# Patient Record
Sex: FEMALE | Race: WHITE | Employment: FULL TIME | URBAN - METROPOLITAN AREA
[De-identification: names, ages, dates, MRNs, and addresses within clinical notes are randomized per-mention and may not be internally consistent; named-entity substitution may affect disease eponyms.]

---

## 2022-09-19 ENCOUNTER — OFFICE VISIT (OUTPATIENT)
Dept: SURGERY | Age: 47
End: 2022-09-19
Payer: COMMERCIAL

## 2022-09-19 VITALS
SYSTOLIC BLOOD PRESSURE: 128 MMHG | BODY MASS INDEX: 45.93 KG/M2 | HEIGHT: 63 IN | OXYGEN SATURATION: 98 % | WEIGHT: 259.2 LBS | HEART RATE: 90 BPM | TEMPERATURE: 97.3 F | DIASTOLIC BLOOD PRESSURE: 79 MMHG

## 2022-09-19 DIAGNOSIS — G47.33 OBSTRUCTIVE SLEEP APNEA SYNDROME: ICD-10-CM

## 2022-09-19 DIAGNOSIS — K21.00 GASTROESOPHAGEAL REFLUX DISEASE WITH ESOPHAGITIS WITHOUT HEMORRHAGE: ICD-10-CM

## 2022-09-19 DIAGNOSIS — E78.00 HIGH CHOLESTEROL: ICD-10-CM

## 2022-09-19 DIAGNOSIS — I10 HYPERTENSION, UNSPECIFIED TYPE: ICD-10-CM

## 2022-09-19 DIAGNOSIS — M19.90 ARTHRITIS: ICD-10-CM

## 2022-09-19 DIAGNOSIS — E66.01 MORBID OBESITY WITH BMI OF 45.0-49.9, ADULT (HCC): ICD-10-CM

## 2022-09-19 DIAGNOSIS — E66.01 MORBID OBESITY (HCC): Primary | ICD-10-CM

## 2022-09-19 PROBLEM — F41.9 ANXIETY: Status: ACTIVE | Noted: 2022-09-19

## 2022-09-19 PROBLEM — K80.20 GALLSTONES: Status: ACTIVE | Noted: 2022-09-19

## 2022-09-19 PROBLEM — K21.9 GERD (GASTROESOPHAGEAL REFLUX DISEASE): Status: ACTIVE | Noted: 2022-09-19

## 2022-09-19 PROBLEM — N20.0 KIDNEY STONES: Status: ACTIVE | Noted: 2022-09-19

## 2022-09-19 PROBLEM — M47.812 DJD (DEGENERATIVE JOINT DISEASE), CERVICAL: Status: ACTIVE | Noted: 2022-09-19

## 2022-09-19 PROCEDURE — 99205 OFFICE O/P NEW HI 60 MIN: CPT | Performed by: NURSE PRACTITIONER

## 2022-09-19 RX ORDER — CONJUGATED ESTROGENS 0.62 MG/G
CREAM VAGINAL
COMMUNITY
Start: 2022-08-08 | End: 2023-04-05

## 2022-09-19 RX ORDER — PANTOPRAZOLE SODIUM 40 MG/1
1 TABLET, DELAYED RELEASE ORAL DAILY
COMMUNITY
Start: 2022-05-26

## 2022-09-19 RX ORDER — FLUTICASONE PROPIONATE 50 MCG
1 SPRAY, SUSPENSION (ML) NASAL DAILY
COMMUNITY

## 2022-09-19 RX ORDER — PROPRANOLOL HYDROCHLORIDE 10 MG/1
1 TABLET ORAL 2 TIMES DAILY
COMMUNITY
Start: 2021-12-13

## 2022-09-19 RX ORDER — CETIRIZINE HYDROCHLORIDE 5 MG/1
5 TABLET ORAL AS NEEDED
COMMUNITY

## 2022-09-19 RX ORDER — VENLAFAXINE HYDROCHLORIDE 150 MG/1
CAPSULE, EXTENDED RELEASE ORAL DAILY
COMMUNITY

## 2022-09-19 RX ORDER — CALCIUM CARBONATE 300MG(750)
1 TABLET,CHEWABLE ORAL DAILY
COMMUNITY

## 2022-09-19 RX ORDER — AMLODIPINE BESYLATE 5 MG/1
TABLET ORAL
COMMUNITY
Start: 2021-12-13

## 2022-09-19 RX ORDER — ROSUVASTATIN CALCIUM 10 MG/1
10 TABLET, COATED ORAL DAILY
COMMUNITY
Start: 2022-07-14 | End: 2023-07-14

## 2022-09-19 RX ORDER — ARIPIPRAZOLE 2 MG/1
TABLET ORAL
COMMUNITY
Start: 2022-02-24

## 2022-09-19 NOTE — PROGRESS NOTES
Bariatric Surgery Consultation    Subjective: The patient is a 56 y.o. obese female with a Body mass index is 45.92 kg/m². .  The patient is at her heaviest weight for the past several years. she has been overweight since age 26.   she has been considering surgery since past year. she desires surgery at this time because of multiple health concerns and their lifestyle issues which are hindered by their weight. she has been referred by her family physician Dr Arcadio Waller for evaluation and treatment of their obesity via surgical intervention. Blanche Lemons has tried multiple diets in her lifetime most recently tried physician supervised, behavior modification, unsupervised diets, Weight Watchers, Iris Cea, [place], and [employer]    Bariatric comorbidities present are   Patient Active Problem List   Diagnosis Code    Morbid obesity ([facility] [address] E66.01    Morbid obesity with BMI of 45.0-49.9, adult ([facility] [address] E66.01, Z68.42    High cholesterol E78.00    Arthritis M19.90    Gallstones K80.20    GERD (gastroesophageal reflux disease) K21.9    Anxiety F41.9    Hypertension I10    Kidney stones N20.0    Obstructive sleep apnea syndrome G47.33    DJD (degenerative joint disease), cervical M47.812       The patient is considering laparoscopic sleeve gastrectomy for surgical weight loss due to their ineffective progress with medical forms of weight loss and the urging of their physician who cares for their primary medical issues. The patient  now presents  for consideration for weight loss surgery understanding the benefits of this over a medical approach of weight loss as was discussed in our presentation on weight loss surgery. They have discussed their plans both with their family and primary care physician who is in support of their pursuit of such. The patient has not had health issues as of late and denies and gastrointestinal disturbances other than what is outlined below in their review of symptoms.  All of their prior evaluations available by both their PCP's and specialists physicians have been reviewed today either in the Care Everywhere portal or scanned under the media tab. I have spent a large portion of my initial consultation today reviewing the patients current dietary habits which have contributed to their health issues and obesity. I have suggested to them personally a dietary regimen that they can initiate now to help with their status as it pertains to their weight. They understand that the most important aspect of their journey through their weight loss endeavor will be their adherence to a new lifestyle of healthy eating behavior. They also understand that an adherence to an exercise program will not only help with weight loss but is ultimately important in weight maintenance. The patients goal weight is 158 lb. These goals are consistent with expected outcomes of their desired operation. her Medical goals are resolution of these health issues. Patient Active Problem List    Diagnosis Date Noted    Morbid obesity ([facility] [address] 09/19/2022    Morbid obesity with BMI of 45.0-49.9, adult ([facility] [address] 09/19/2022    High cholesterol 09/19/2022    Arthritis 09/19/2022    Gallstones 09/19/2022    GERD (gastroesophageal reflux disease) 09/19/2022    Anxiety 09/19/2022    Kidney stones 09/19/2022    DJD (degenerative joint disease), cervical 09/19/2022    Obstructive sleep apnea syndrome 2020    Hypertension 2017     Past Surgical History:   Procedure Laterality Date    HX ACL RECONSTRUCTION      HX HYSTERECTOMY        Social History     Tobacco Use    Smoking status: Never    Smokeless tobacco: Never   Substance Use Topics    Alcohol use: Yes      Family History   Problem Relation Age of Onset    Immune Disorder Mother     No Known Problems Father       Current Outpatient Medications   Medication Sig Dispense Refill    CHOLECALCIFEROL, VITAMIN D3, PO Take 5,000 Units by mouth daily.       magnesium oxide 400 mg magnesium tab Take 1 Tablet by mouth daily. amLODIPine (NORVASC) 5 mg tablet TAKE 1 TABLET DAILY (DOSE DECREASE)      ARIPiprazole (ABILIFY) 2 mg tablet       cetirizine (ZYRTEC) 5 mg tablet Take 5 mg by mouth as needed. conjugated estrogens (Premarin) 0.625 mg/gram vaginal cream Apply 1 gm intravaginally at bedtime three times a week for 8 weeks      fluticasone propionate (FLONASE) 50 mcg/actuation nasal spray 1 [place] by Nasal route daily. pantoprazole (PROTONIX) 40 mg tablet 1 Tablet daily. propranoloL (INDERAL) 10 mg tablet 1 Tablet two (2) times a day. rosuvastatin (CRESTOR) 10 mg tablet Take 10 mg by mouth daily. venlafaxine-SR (Effexor XR) 150 mg capsule Take  by mouth daily.        Allergies   Allergen Reactions    Cephalosporins Hives          Review of Systems:       General - No history or complaints of unexpected fever, chills, or weight loss  Head/Neck - No history or complaints of headache, diplopia, dysphagia, hearing loss  Cardiac - No history or complaints of chest pain, palpitations, murmur, or shortness of breath  Pulmonary - No history or complaints of shortness of breath, productive cough, hemoptysis  Gastrointestinal - has reflux controlled with PPI,no  abdominal pain, obstipation/constipation or blood per rectum  Genitourinary - No history or complaints of hematuria/dysuria, stress urinary incontinence symptoms, or renal lithiasis  Musculoskeletal - has joint pain in their knees,  no muscular weakness  Hematologic - No history or complaints of bleeding disorders,  No blood transfusions  Neurologic - No history or complaints of  migraine headaches, seizure activity, syncopal episodes, TIA or stroke  Integumentary - No history or complaints of rashes, abnormal nevi, skin cancer  Gynecological - No abnormal bleeding s/p hysterectomy               Objective:   Visit Vitals  /79   Pulse 90   Temp 97.3 °F (36.3 °C)   Ht 5' 3\" (1.6 m)   Wt 117.6 kg (259 lb 3.2 oz)   SpO2 98% BMI 45.92 kg/m²       Physical Examination: General appearance - alert, well appearing, and in no distress  Mental status - alert, oriented to person, place, and time  Eyes - pupils equal and reactive, extraocular eye movements intact  Neck - supple, no significant adenopathy  Lymphatics - no palpable lymphadenopathy, no hepatosplenomegaly  Chest - clear to auscultation, no wheezes, rales or rhonchi, symmetric air entry  Heart - normal rate, regular rhythm, normal S1, S2, no murmurs, rubs, clicks or gallops  Abdomen - soft, nontender, nondistended, no masses or organomegaly  Back exam - full range of motion, no tenderness, palpable spasm or pain on motion  Neurological - alert, oriented, normal speech, no focal findings or movement disorder noted  Musculoskeletal - no joint tenderness, deformity or swelling  Extremities - peripheral pulses normal, no pedal edema, no clubbing or cyanosis  Skin - normal coloration and turgor, no rashes, no suspicious skin lesions noted    Labs:       No results found for this or any previous visit (from the past 1440 hour(s)). [facility]:  TSH 2.921  Free T4 0.68    7/7/22:  Hgb 12.7  Hct 39.8  Crt 0.55  LFTs WNL    Assessment:     Morbid obesity with comorbidity    Plan:     laparoscopic sleeve gastrectomy    This is a 56 y.o. female with a BMI of Body mass index is 45.92 kg/m². and the weight-related co-morbidties as noted below. Edmar Lau meets the NIH criteria for bariatric surgery based upon the BMI of Body mass index is 45.92 kg/m². and multiple weight-related co-morbidties. Edmar Lau has elected laparoscopic sleeve gastrectomy as her intervention of choice for treatment of morbid obestiy through surgical means secondary to its safety profile, rapid return to work  and decreases in operative risks over gastric bypass.     In the office today, following Melissa's history and physical examination, a 30 minute discussion regarding the anatomic alterations for the laparoscopic sleeve gastrectomy was undertaken. The dietary expectations and the patient and physician dependent factors for success were thoroughly discussed, to include the need for interval follow-up and long-term dietary changes associated with success. The possible complications of the sleeve gastrectomy  were also discussed, to include;death, DVT/PE, staple line leak, bleeding, stricture formation, infection, nutritional deficiencies and sleeve dilation. Specific weight related outcomes for success were also discussed with an emphasis on careful and close follow-up with the first year and eating behavior modification as the baseline and cyclical hunger return. The patient expressed an understanding of the above factors, and her questions were answered in their entirety. In addition, the patient attended a 1.5 hour power point seminar regarding obesity, surgical weight loss including, adjustable gastric band, gastric bypass, and sleeve gastrectomy. This discussion contrasted the different surgical techniques, mechanisms of actions and expected outcomes, and surgical and medical risks associated with each procedure. Today, the patient had all of her questions answered and desires to proceed with  bariatric surgery initially choosing sleeve gastrectomy as her surgical option. Long discussion had that due to her reflux requiring PPI and documented erosive gastritis on EGD from March 2021 that gastric bypass maybe better surgical option for her and she is agreeable to that is necessary. Will proceed with getting upper GI imaging to evaluate anatomy and degree of reflux. Secondary Diagnoses:     Dietary Intervention  - The patient is currently scheduled to see or has been followed by a bariatric nutritionist for an attempt at preoperative weight loss as has been dictated by their insurance carrier.   They will be assessed at various times during their follow up to evaluate their progress depending on the length of time that is required once again by their carrier. I have explained the importance of preoperative weight loss and the benefits regarding lower surgical risk and also assisting the patient in reaching their weight loss goal.  Finally they understand there is a physiologic benefit from the standpoint of hepatic volume reduction and reduction of central visceral adiposity preoperatively. I have reiterated the importance of a low carbohydrate and high protein regimen to achieve their stated goal. I have reviewed their current eating behavior prior to this encounter and explained to them in an exhaustive fashion the appropriate diet that they should adhere to. They have been encouraged to loose weight pre operatively and understand it is our prerogative to cancel surgery or postpone their procedure in the event of significant weight gain. The patients weight loss goal pre operatively is 10-15 pounds. GERD -The patient understands that weight loss surgery is not a guaranteed cure for reflux disease but does understand the benefits that weight loss can have on reflux disease. They also understand that at the time of surgery the gastroesophageal junction will be evaluated for the presence of a diaphragmatic hernia. Hernias will be corrected always with the gastric band and sleeve gastrectomy procedures, but only on a case by case basis with the gastric bypass. The patient also understands that neither weight loss surgery nor repair of a diaphragmatic hernia repair guarantees the complete cessation of the disease. Hypertension - the patient understands, and we have discussed in detail, that this is an active acute health problem that has a multifactorial effect on their body from the standpoint of healing. They understand that uncontrolled hypertension affects other organ processes and this leads to risk associated with surgical procedures.   They understand that in addition to hypertension, once they develop other health issues, their risk is exponentially worse. Currently they understand that this likely one of the single most important items that they need to control in the perioperative process. They understand that the hospitals protocol is that patients entering the operative suite should have a blood pressure reading less than 140/90 prior to surgery. Elevated readings today in office above 160/90 are recommended emergent PCP follow-up or if symptomatic to proceed to the ED. The patient has a clear understanding of how weight loss improves hypertension as a whole, but also they understand that there is a significant genetic component to this disease process. We will monitor the patients blood pressure while in the hospital and the plan would be to continue those medications postoperatively. If a diuretic is being used we will stop them on discharge to prevent dehydration particularly with the sleeve gastrectomy and the gastric bypass procedures. They will be instructed to monitor their blood pressure postoperatively while at home and notify their primary care physician in the event of any significantly high or uncharacteristic readings. Obstructive Sleep Apnea -The patient understands the association of sleep apnea and obesity and the additional risk that it caries related to post surgical complications. If they have not been tested for sleep apnea and I feel they are at increased risk for this diagnosis, then they will be scheduled for a consultation with a Pulmonologist for such. In the event that they maris this diagnosis we will have the patient bring their CPAP machine to the hospital for use both postoperatively in the PACU and on the floor at its appropriate setting. We will have them continue using it while at home after surgery and follow up with their pulmonologist 6 months after to be retested to see if it can be discontinued at that time period. Hyperlipidemia - The patient understands that studies show that almost all patient will realize an improvement in their lipid profile with weight loss that occurs with these procedures. They however also understand that hyperlipidemia is a multifactorial disease particularly as it pertains to their genetic background and that there is no guarantee toward cure  of this issue. We will resume their medications both pre operatively and immediately postoperatively as this tends to decrease any post operative cardiac events. The patient will follow up with their family physician in the postoperative period with plans to repeat their lipid panel 2-3 month postoperative for potential adjustment or removal of these medications. Weight Related Arthritis -The patient understands the benefits that weight loss surgery can have on their arthritis but also understands that weight loss is not a guaranteed cure and relief of symptoms is often dependent on the severity of the underlying disease. The patient also understands that traditional pharmaceutical treatments for this diagnosis are usually unavailable to post-operative weight loss patients due to the effects on the gastrointestinal tract. Any changes to the patients medication treatment will ultimately be made the patients PCP with input by our office. I spent a total of 60 minutes providing this service to the patient today to include discussing their surgical choice, reviewing their work-up to date, and performing a full history and physical examination.   Signed By: Tabitha Pelayo NP     September 19, 2022

## 2022-10-05 ENCOUNTER — APPOINTMENT (OUTPATIENT)
Dept: SURGERY | Age: 47
End: 2022-10-05

## 2022-10-05 ENCOUNTER — HOSPITAL ENCOUNTER (OUTPATIENT)
Age: 47
Setting detail: OUTPATIENT SURGERY
Discharge: HOME | End: 2022-10-05
Attending: SPECIALIST | Admitting: SPECIALIST
Payer: COMMERCIAL

## 2022-10-05 ENCOUNTER — APPOINTMENT (OUTPATIENT)
Dept: GENERAL RADIOLOGY | Age: 47
End: 2022-10-05
Attending: SPECIALIST
Payer: COMMERCIAL

## 2022-10-05 ENCOUNTER — HOSPITAL ENCOUNTER (OUTPATIENT)
Dept: PREADMISSION TESTING | Age: 47
Discharge: HOME | End: 2022-10-05
Payer: COMMERCIAL

## 2022-10-05 ENCOUNTER — HOSPITAL ENCOUNTER (OUTPATIENT)
Dept: BARIATRICS/WEIGHT MGMT | Age: 47
Discharge: HOME | End: 2022-10-05

## 2022-10-05 VITALS
HEART RATE: 87 BPM | BODY MASS INDEX: 45.68 KG/M2 | WEIGHT: 257.8 LBS | OXYGEN SATURATION: 98 % | DIASTOLIC BLOOD PRESSURE: 86 MMHG | RESPIRATION RATE: 18 BRPM | HEIGHT: 63 IN | SYSTOLIC BLOOD PRESSURE: 140 MMHG

## 2022-10-05 VITALS — BODY MASS INDEX: 46 KG/M2 | WEIGHT: 259.6 LBS | HEIGHT: 63 IN

## 2022-10-05 DIAGNOSIS — E66.01 MORBID OBESITY WITH BMI OF 45.0-49.9, ADULT (HCC): ICD-10-CM

## 2022-10-05 DIAGNOSIS — I10 HYPERTENSION, UNSPECIFIED TYPE: ICD-10-CM

## 2022-10-05 DIAGNOSIS — K21.9 GASTROESOPHAGEAL REFLUX DISEASE, UNSPECIFIED WHETHER ESOPHAGITIS PRESENT: Primary | ICD-10-CM

## 2022-10-05 DIAGNOSIS — G47.33 OBSTRUCTIVE SLEEP APNEA SYNDROME: ICD-10-CM

## 2022-10-05 DIAGNOSIS — E66.01 MORBID OBESITY (HCC): ICD-10-CM

## 2022-10-05 DIAGNOSIS — K21.00 GASTROESOPHAGEAL REFLUX DISEASE WITH ESOPHAGITIS WITHOUT HEMORRHAGE: ICD-10-CM

## 2022-10-05 DIAGNOSIS — E78.00 HIGH CHOLESTEROL: ICD-10-CM

## 2022-10-05 DIAGNOSIS — M19.90 ARTHRITIS: ICD-10-CM

## 2022-10-05 LAB
ATRIAL RATE: 97 BPM
CALCULATED P AXIS, ECG09: 53 DEGREES
CALCULATED R AXIS, ECG10: 83 DEGREES
CALCULATED T AXIS, ECG11: 25 DEGREES
DIAGNOSIS, 93000: NORMAL
P-R INTERVAL, ECG05: 150 MS
Q-T INTERVAL, ECG07: 364 MS
QRS DURATION, ECG06: 94 MS
QTC CALCULATION (BEZET), ECG08: 462 MS
VENTRICULAR RATE, ECG03: 97 BPM

## 2022-10-05 PROCEDURE — 74240 X-RAY XM UPR GI TRC 1CNTRST: CPT | Performed by: SPECIALIST

## 2022-10-05 PROCEDURE — 74011000250 HC RX REV CODE- 250: Performed by: SPECIALIST

## 2022-10-05 PROCEDURE — 93005 ELECTROCARDIOGRAM TRACING: CPT

## 2022-10-05 PROCEDURE — 76040000019: Performed by: SPECIALIST

## 2022-10-05 PROCEDURE — 74240 X-RAY XM UPR GI TRC 1CNTRST: CPT

## 2022-10-05 NOTE — PROCEDURES
Patient:Melissa Lewis   : 1975  Medical Record [id number]        PREPROCEDURE DIAGNOSIS: This patient is preoperative for laparoscopic gastric bypass surgery procedure with a history of  reflux disease. POSTPROCEDURE DIAGNOSIS: This patient is preoperative for laparoscopic gastric bypass surgery procedure with a history of  reflux disease. PROCEDURES PERFORMED: Upper GI study with barium. ESTIMATED BLOOD LOSS: None. SPECIMENS: None. STATEMENT OF MEDICAL NECESSITY: The patient is a patient with a  longstanding history of obesity. They are now considering the laparoscopic gastric bypass surgery procedure as a means of surgical weight control and due to their history of reflux disease and are being assessed preoperatively for such. DESCRIPTION OF PROCEDURE: The patient was brought to the fluoroscopy unit and  was given thin barium. On swallowing of barium, they were noted to have  normal peristalsis of their esophagus. They had prompt filling of distal  esophagus with tapering into the gastroesophageal junction. There was no evidence of a hiatal hernia present. Contrast then filled the gastric cardia, fundus,body and pre pyloric region with no abnormalities noted. Contrast then exited the pylorus in normal fashion. No obstruction was noted. There was no evidence of reflux noted.     (normal anatomy - she was consulted for a sleeve last month but has now decided on the bypass)    Peggy Irizarry MD

## 2022-10-05 NOTE — PROGRESS NOTES
Medical Weight Loss Multi-Disciplinary Program    Patient's Name: Nando Edge Age: 56 y.o. YOB: 1975 Sex: female     Session #1. Pt attended in-person class. Weight obtained in office. Date: 10/5/2022    Visit Vitals  Ht 5' 3\" (1.6 m)   Wt 117.8 kg (259 lb 9.6 oz)   BMI 45.99 kg/m²       Pounds Lost since last month: N/A Pounds Gained since last month: N/A       Starting Weight (Initial consult, 9/19/22): 259.2 lbs Previous Months Weight: N/A   Overall Pounds Lost: 0 lbs  Overall Pounds Gained: 0.4 lbs     Note that pt has a pre-operative weight loss goal of 10-15 lbs. Pt has not met this goal.       Do you smoke? No    Alcohol intake:  Number of drinks per week: <1    Class Guidelines    Guidelines are reviewed with patient at the start of every class. 1. Patient understands that weight loss trial classes must be consecutive. Patient understands if they miss a class, it is their responsibility to contact me to reschedule class. I will reach out to patient after their first no show. 2.  Patient understands the expectations that weight maintenance/weight loss is expected during the classes. Failure to demonstrate changes may result in extension of weight loss trial, followed by returning to see the surgeon. Patient understands that they CANNOT gain any weight during the weight loss trial.  Gaining weight will result in extension of weight loss trial.  3. Patient is also instructed to complete their labwork, psychological evaluation visit, and any other tests that the surgeon has ordered while they are working on their weight loss trial.  4.  Patient was instructed to bring their packet of nutrition education materials to every class and appointment.         Eating Habits and Behaviors    Reviewed intake  Understanding low-carbohydrate/low-sugar/low-fat, higher protein meals  Instruction given for personal dietary changes  Discussed perceived compliance    Comments: RD Reviewed Diet History and Physical Activity/Exercise habits. Recommended dietary changes discussed for both before and after surgery. Today in class we reviewed the Key Diet Principles. Patient was encouraged to consume 3 meals each day, and meal timing was reviewed. Meal time behaviors that will help pt to be successful with their weight loss efforts were also discussed. We discussed the importance of drinking adequate amounts of fluids, recommending that patient consume a minimum of 64 oz of sugar-free, caffeine-free and carbonation-free fluids each day. Patient was encouraged to eliminate sugar-sweetened beverages such as sweet tea, fruit juice, fruit smoothies, flavored coffee drinks and regular sodas. During the weight loss trial, patient is encouraged to focus on eating protein-forward meals, with a daily goal of  grams protein. Patient was also advised to decrease carbohydrate intake to <100 g/d, choosing complex vs. simple carbohydrates in limited amounts. We also discussed limiting fat intake. Encouraged patient to follow the \"3-gram rule\" when choosing foods by looking for items containing <3 g of fat and sugar per serving. We reviewed meal planning guidelines and discussed appropriate meal and snack options. We also talked about appropriate protein drinks and patient was encouraged to start trying these, using them either for a meal replacement or a protein-rich snack pre-operatively. We reviewed the nutritional guidelines for selecting protein shakes. Pre-operative vitamin and mineral supplementation was reviewed. Patient was instructed to choose a chewable complete multivitamin with iron in NON-gummy form. Selection of probiotic was also reviewed. We also talked about dining out after bariatric surgery. Patient was instructed on:   Following the Key Diet Principles to stay within the bariatric dietary guidelines  Key words to look for in menu item descriptions in order to make healthier choices  Requesting specific substitutions to allow meals to fit in with bariatric dietary guidelines  Using the restaurant card to request smaller portions of menu items or ordering from children's/senior's menu    Patient's current diet habits include:  Patient is eating 3 meals and 1 snacks per day. Per dietary recall, pts diet has the following concerns:  Snacks are high carbohydrate/low protein (I.e. cereal w/ milk)  Pt is consuming sugar sweetened foods 3 d/wk. Pt is consuming 4-6 oz sugar-sweetened beverages/day. Pt has found 1 protein supplement shakes for use post-op in meeting their protein needs. Patient's plan for dietary and/or behavior changes in the upcoming month: none noted    Physical Activity/Exercise    Comments: We talked about exercise. Patient was given reasons of why exercise is so important and how that can help with their long-term success. I have encouraged patient to get a support system to help with the activity. We talked about recommended types of physical activity, including walking, swimming, cycling, or chair exercises. I also talked with patient about doing some strength training, which helps the metabolism, as well as strengthen muscles and bones. Patient's plan to incorporate more activity includes: \"start walking the dog daily at night, weekly hike at a local park for 60-90 min at a minimum. \"      Behavior Modification     Comments:  During today's class, we discussed important behavior changes relating to dining away from home that will help the patient be successful in meeting their weight loss goals and improving their health including:   The need to limit the frequency of dining away from home, especially fast food and convenience food options, in order to limit intake of calories, fats, and carbohydrates  The importance of keeping a positive mindset by focusing on the occasion and enjoying the company rather than the foods they can't have and the need for reduced portion sizes after surgery  Avoiding starters such as appetizers, breads, bar snacks, soups, and salads  Researching to find restaurants that offer healthier food options and reviewing menus/nutrition facts online to have a plan of what to order before dining out    Goals: Work to increase to 3-4 small meals per day, with 1-2 planned snacks as needed. Recommend following the plate method for meal planning - focusing on lean protein, non-starchy vegetables, and measured amounts of complex carbohydrates. - Goal of  g protein and <100 g carbohydrates per day. - Select at least 2 DIFFERENT protein supplements that can be used for protein supplementation to meet goals pre- and post-operatively. - Practice Carbohydrate Counting and label reading.   - Follow 3 g rule for fat and sugar. 2. Slow down meals  - Chew each bite 25-35 times. - Aim for 20-30 min/meal.  3. Increase non-caloric fluids to 64 oz per day. Eliminate caffeine, added sugar, carbonation, and straws.               - Continue to work to decrease sugar sweetened beverages - goal of calorie-free beverages only. - Must eliminate caffeine prior to surgery and avoid for ~6-8 weeks. - Practice 30:30 rule,  food and fluid intake. 4. Start activity regimen, work to increase ADLs. 5. Start Complete MVI and probiotic at least 30 days pre-op. Candidate for surgery (per RD): PENDING, Pt scheduled for nutrition education on 11/1/22.

## 2022-11-01 ENCOUNTER — HOSPITAL ENCOUNTER (OUTPATIENT)
Dept: BARIATRICS/WEIGHT MGMT | Age: 47
Discharge: HOME | End: 2022-11-01

## 2022-11-01 VITALS — BODY MASS INDEX: 43.71 KG/M2 | HEIGHT: 63 IN | WEIGHT: 246.7 LBS

## 2022-11-01 NOTE — PROGRESS NOTES
Medical Weight Loss Multi-Disciplinary Program    Patient's Name: Valentino Simon Age: 56 y.o. YOB: 1975 Sex: female     Session #2. Pt attended in-person class. Weight obtained in office. Date: 11/1/2022    Visit Vitals  Ht 5' 3\" (1.6 m)   Wt 111.9 kg (246 lb 11.2 oz)   BMI 43.70 kg/m²       Pounds Lost since last month: 12.9 lbs Pounds Gained since last month: 0 lbs       Starting Weight: 259.2 lbs Previous Months Weight: 259.6 lbs   Overall Pounds Lost: 12.5 lbs  Overall Pounds Gained: 0 lbs     Note that pt has a pre-operative weight loss goal of 10-15 lbs. Pt has met this goal.       Do you smoke? No    Alcohol intake:  Number of drinks per week:  <2 per month    Class Guidelines    Guidelines are reviewed with patient at the start of every class. 1. Patient understands that weight loss trial classes must be consecutive. Patient understands if they miss a class, it is their responsibility to contact me to reschedule class. I will reach out to patient after their first no show. 2.  Patient understands the expectations that weight maintenance/weight loss is expected during the classes. Failure to demonstrate changes may result in extension of weight loss trial, followed by returning to see the surgeon. Patient understands that they CANNOT gain any weight during the weight loss trial.  Gaining weight will result in extension of weight loss trial.  3. Patient is also instructed to complete their labwork, psychological evaluation visit, and any other tests that the surgeon has ordered while they are working on their weight loss trial.  4.  Patient was instructed to bring their packet of nutrition education materials to every class and appointment.         Eating Habits and Behaviors    Reviewed intake  Understanding low carbohydrates, low sugar, higher protein meals  Instruction given for personal dietary changes  Discussed perceived compliance    Comments: RD Reviewed Diet History and Physical Activity/Exercise habits. Recommended dietary changes discussed for both before and after surgery. Today in class we reviewed the Key Diet Principles. Patient was encouraged to consume 3 meals each day, and meal timing was reviewed. Meal time behaviors that will help pt to be successful with their weight loss efforts were also discussed. We discussed the importance of drinking adequate amounts of fluids, recommending that patient consume a minimum of 64 oz of sugar-free, caffeine-free and carbonation-free fluids each day. Patient was encouraged to eliminate sugar-sweetened beverages such as sweet tea, fruit juice, fruit smoothies, flavored coffee drinks and regular sodas. During the weight loss trial, patient is encouraged to focus on eating protein-forward meals, with a daily goal of  grams protein. Patient was also advised to decrease carbohydrate intake to <100 g/d, choosing complex vs. simple carbohydrates in limited amounts. We also discussed limiting fat intake. Encouraged patient to follow the \"3-gram rule\" when choosing foods by looking for items containing <3 g of fat and sugar per serving. We reviewed meal planning guidelines and discussed appropriate meal and snack options. We also talked about appropriate protein drinks and patient was encouraged to start trying these, using them either for a meal replacement or a protein-rich snack pre-operatively. We reviewed the nutritional guidelines for selecting protein shakes. Pre-operative vitamin and mineral supplementation was reviewed. Patient was instructed to choose a chewable complete multivitamin with iron in NON-gummy form. Selection of probiotic was also reviewed. Comments: During today's class we talked about the role of carbohydrates. Patient was instructed on: The function of carbohydrates. Foods that are carbohydrate-heavy.   Patient was given the guidelines to keep their carbohydrates less than 50-75 grams per day in the pre-op phase. Patient was also given ideas of low carb swaps, which include zucchini noodles, spaghetti squash, or cauliflower rice. Discussed lower carb swaps to use instead of potato chips. Patient's current diet habits include:  Patient is eating 3 meals and 1-2 snacks per day. Per dietary recall, pts diet has the following concerns:  None noted   Pt has found 4 protein supplement shakes for use post-op in meeting their protein needs. Patient's plan for dietary and/or behavior changes in the upcoming month: none noted    Physical Activity/Exercise    Comments: We talked about exercise. Patient was given reasons of why exercise is so important and how that can help with their long-term success. I have encouraged patient to get a support system to help with the activity. We talked about recommended types of physical activity, including walking, swimming, cycling, or chair exercises. I also talked with patient about doing some strength training, which helps the metabolism, as well as strengthen muscles and bones. Patient's plan to incorporate more activity includes: \"Started to use ergometer for increased heart rate 3x/wk vs. fast walking. I take a walk on the days I don't use the ergometer. \"      Behavior Modification     Comments:  During today's class, I discussed vitamin and mineral supplementation essential for health and prevention of malnutrition in depth. Patient was directed to the handouts on vitamins and minerals found on pages 27-33 that were provided in class handouts packet as well as a handout titled, \"Nutrient Deficiency and it's symptoms\". I reviewed the vitamins and minerals that need to be supplemented, dosage recommendations, functions of supplements and signs of deficiencies, as well as examples of specific supplements.   Reviewed briefly the requirement  differences between laparoscopic gastric bypass, laparoscopic sleeve gastrectomy, and lap-band procedures, while encouraging all patients to continue supplements for life no matter what bariatric surgery they are preparing for. Goals: Work to increase to 3-4 small meals per day, with 1-2 planned snacks as needed. Recommend following the plate method for meal planning - focusing on lean protein, non-starchy vegetables, and measured amounts of complex carbohydrates. - Goal of  g protein and <100 g carbohydrates per day. - Select at least 2 DIFFERENT protein supplements that can be used for protein supplementation to meet goals pre- and post-operatively. - Practice Carbohydrate Counting and label reading.   - Follow 3 g rule for fat and sugar. 2. Slow down meals  - Chew each bite 25-35 times. - Aim for 20-30 min/meal.  3. Increase non-caloric fluids to 64 oz per day. Eliminate caffeine, added sugar, carbonation, and straws.               - Continue to work to decrease sugar sweetened beverages - goal of calorie-free beverages only. - Must eliminate caffeine prior to surgery and avoid for ~6-8 weeks. - Practice 30:30 rule,  food and fluid intake. 4. Start activity regimen, work to increase ADLs. 5. Start Complete MVI and probiotic at least 30 days pre-op. Candidate for surgery (per RD): PENDING, Pt scheduled for nutrition education on 12/1/22.

## 2022-12-01 ENCOUNTER — HOSPITAL ENCOUNTER (OUTPATIENT)
Dept: BARIATRICS/WEIGHT MGMT | Age: 47
Discharge: HOME | End: 2022-12-01

## 2022-12-15 NOTE — PROGRESS NOTES
Medical Weight Loss Multi-Disciplinary Program    Patient's Name: Anirudh Rivera Age: 56 y.o. YOB: 1975 Sex: female     Session #3. Pt attended in-person class. Weight obtained in office. Date: 12/1/2022    Visit Vitals  Ht 5' 3\" (1.6 m)   Wt 108 kg (238 lb)   BMI 42.16 kg/m²       Pounds Lost since last month: 8.7 lbs Pounds Gained since last month: 0 lbs       Starting Weight: 259.2 lbs Previous Months Weight: 246.7 lbs   Overall Pounds Lost: 21.2 lbs  Overall Pounds Gained: 0 lbs     Note that pt has a pre-operative weight loss goal of 10-15 lbs. Pt has met this goal.       Do you smoke? No    Alcohol intake:  Number of drinks per week: 0    Class Guidelines    Guidelines are reviewed with patient at the start of every class. 1. Patient understands that weight loss trial classes must be consecutive. Patient understands if they miss a class, it is their responsibility to contact me to reschedule class. I will reach out to patient after their first no show. 2.  Patient understands the expectations that weight maintenance/weight loss is expected during the classes. Failure to demonstrate changes may result in extension of weight loss trial, followed by returning to see the surgeon. Patient understands that they CANNOT gain any weight during the weight loss trial.  Gaining weight will result in extension of weight loss trial.  3. Patient is also instructed to complete their labwork, psychological evaluation visit, and any other tests that the surgeon has ordered while they are working on their weight loss trial.  4.  Patient was instructed to bring their packet of nutrition education materials to every class and appointment.         Eating Habits and Behaviors    Reviewed intake  Understanding low carbohydrates, low sugar, higher protein meals  Instruction given for personal dietary changes  Discussed perceived compliance    Comments: RD Reviewed Diet History and Physical Activity/Exercise habits. Recommended dietary changes discussed for both before and after surgery. Today in class we reviewed the Key Diet Principles. Patient was encouraged to consume 3 meals each day, and meal timing was reviewed. Meal time behaviors that will help pt to be successful with their weight loss efforts were also discussed. We discussed the importance of drinking adequate amounts of fluids, recommending that patient consume a minimum of 64 oz of sugar-free, caffeine-free and carbonation-free fluids each day. Patient was encouraged to eliminate sugar-sweetened beverages such as sweet tea, fruit juice, fruit smoothies, flavored coffee drinks and regular sodas. During the weight loss trial, patient is encouraged to focus on eating protein-forward meals, with a daily goal of  grams protein. Patient was also advised to decrease carbohydrate intake to <100 g/d, choosing complex vs. simple carbohydrates in limited amounts. We also discussed limiting fat intake. Encouraged patient to follow the \"3-gram rule\" when choosing foods by looking for items containing <3 g of fat and sugar per serving. We reviewed meal planning guidelines and discussed appropriate meal and snack options. We also talked about appropriate protein drinks and patient was encouraged to start trying these, using them either for a meal replacement or a protein-rich snack pre-operatively. We reviewed the nutritional guidelines for selecting protein shakes. Pre-operative vitamin and mineral supplementation was reviewed. Patient was instructed to choose a chewable complete multivitamin with iron in NON-gummy form. Selection of probiotic was also reviewed. Comments: During today's class we talked about making healthy dietary choices during the holidays. . Patient was instructed on: Thoughtful and Deliberate eating habits.   Patient was given the guidelines to keep their carbohydrates less than 50-75 grams per day in the pre-op phase. Patient was also given ideas for substituting lower carbohydrate food options for typical carbohydrate-heavy food options, such as carrot souflee for sweet potatoes. Discussed planning and preparing for holiday meals through food ingredient substitutions in recipes. Reviewed appropriate holiday beverages in the pre-op phase. Suggestions for \"sweet treats\" that could be used in place of carbohydrate- and fat-heavy desserts typical during the holidays were provided to pt. Handouts Provided:  Bariatric Friendly Holiday Recipes  Holiday Eating Survival Guide    Patient's current diet habits include:  Patient is eating 3 meals and 1 snacks per day. Per dietary recall, pts diet has the following concerns:  Pt has found 5 protein supplement shakes for use post-op in meeting their protein needs. None noted    Patient's plan for dietary and/or behavior changes in the upcoming month: none noted    Physical Activity/Exercise    Comments: We talked about exercise. Patient was given reasons of why exercise is so important and how that can help with their long-term success. I have encouraged patient to get a support system to help with the activity. We talked about recommended types of physical activity, including walking, swimming, cycling, or chair exercises. I also talked with patient about doing some strength training, which helps the metabolism, as well as strengthen muscles and bones. Patient's plan to incorporate more activity includes: \"increase time on rowing machine, start lifting weights. \"      Behavior Modification     Comments:  During today's class, we discussed the benefits of regular physical activity. Specific guidelines for cardiovascular exercise and resistance/strength training were reviewed, as well as appropriate types of physical activity.   Patient was directed to the handout, \"Overcoming Barriers to Exercise\", where we reviewed the importance of making physical activity part of a healthy lifestyle rather than just a way of meeting a weight loss goal.  We also discussed  for specific tips on fitting physical activity in when patients feel they are too busy to exercise and during inclement weather, as well as ideas for low/no cost exercise, and ways to exercise despite physical limitations. Patient was instructed to discuss any physical limitations with their healthcare provider. A list of ways to facilitate regular physical activity was reviewed, as well as how to get started with a regular physical activity regimen. Goals: Work to increase to 3-4 small meals per day, with 1-2 planned snacks as needed. Recommend following the plate method for meal planning - focusing on lean protein, non-starchy vegetables, and measured amounts of complex carbohydrates. - Goal of  g protein and <100 g carbohydrates per day. - Select at least 2 DIFFERENT protein supplements that can be used for protein supplementation to meet goals pre- and post-operatively. - Practice Carbohydrate Counting and label reading.   - Follow 3 g rule for fat and sugar. 2. Slow down meals  - Chew each bite 25-35 times. - Aim for 20-30 min/meal.  3. Increase non-caloric fluids to 64 oz per day. Eliminate caffeine, added sugar, carbonation, and straws.               - Continue to work to decrease sugar sweetened beverages - goal of calorie-free beverages only. - Must eliminate caffeine prior to surgery and avoid for ~6-8 weeks. - Practice 30:30 rule,  food and fluid intake. 4. Start activity regimen, work to increase ADLs. 5. Start Complete MVI and probiotic at least 30 days pre-op.     Candidate for surgery (per RD): YES - Pt acknowledges understanding that bariatric surgery requires lifelong adherence to dietary and exercise behavior change recommendations in order to be successful with weight loss and long-term weight maintenance once weight loss goal is met. Pt demonstrates understanding of post-op key diet principles and recommendations through recall and class discussion. Pt passed bariatric surgery nutrition quiz with at least 80% pass rate. Patient is aware that they will be attending pre-op class 2 weeks before surgery and will get more detailed information on the post-op diet guidelines. Patient will have post-op telephone nutrition appointment at 2 weeks post-op. At 2 weeks post-op appt, RD will assess patient's liquid diet tolerance and readiness to advance to soft/pureed diet. Pt will also have a follow-up telephone nutrition appointment at 1 mo. post op. At 1-month post-op visit, RD will assess how patient is tolerating soft/puree protein and advance to add vegetables, if tolerating soft protein without difficulty. At this appointment, RD will also add vitamins, per protocol, to pts current vitamin and mineral regimen. RD will assess patient's compliance with current protocol, including diet, vitamins, protein shakes, and exercise. Post-op diet guidelines will be reinforced. RD is available for questions and to meet with patient outside of the 2 week and 1-month post-op visits.

## 2023-01-19 DIAGNOSIS — I10 HYPERTENSION, UNSPECIFIED TYPE: ICD-10-CM

## 2023-01-19 DIAGNOSIS — E66.01 MORBID OBESITY (HCC): Primary | ICD-10-CM

## 2023-01-19 DIAGNOSIS — G47.33 OBSTRUCTIVE SLEEP APNEA SYNDROME: ICD-10-CM

## 2023-01-19 DIAGNOSIS — Z01.812 BLOOD TESTS PRIOR TO TREATMENT OR PROCEDURE: ICD-10-CM

## 2023-01-19 DIAGNOSIS — E66.01 MORBID OBESITY WITH BMI OF 45.0-49.9, ADULT (HCC): ICD-10-CM

## 2023-01-24 ENCOUNTER — HOSPITAL ENCOUNTER (OUTPATIENT)
Dept: PREADMISSION TESTING | Age: 48
Discharge: HOME | End: 2023-01-24
Payer: COMMERCIAL

## 2023-01-24 LAB
ALBUMIN SERPL-MCNC: 3.6 G/DL (ref 3.4–5)
ALBUMIN/GLOB SERPL: 1 (ref 0.8–1.7)
ALP SERPL-CCNC: 81 U/L (ref 45–117)
ALT SERPL-CCNC: 21 U/L (ref 13–56)
ANION GAP SERPL CALC-SCNC: 5 MMOL/L (ref 3–18)
AST SERPL-CCNC: 11 U/L (ref 10–38)
ATRIAL RATE: 73 BPM
BASOPHILS # BLD: 0 K/UL (ref 0–0.1)
BASOPHILS NFR BLD: 0 % (ref 0–2)
BILIRUB SERPL-MCNC: 0.3 MG/DL (ref 0.2–1)
BUN SERPL-MCNC: 19 MG/DL (ref 7–18)
BUN/CREAT SERPL: 30 (ref 12–20)
CALCIUM SERPL-MCNC: 9.1 MG/DL (ref 8.5–10.1)
CALCULATED P AXIS, ECG09: 23 DEGREES
CALCULATED R AXIS, ECG10: 33 DEGREES
CALCULATED T AXIS, ECG11: 31 DEGREES
CHLORIDE SERPL-SCNC: 106 MMOL/L (ref 100–111)
CO2 SERPL-SCNC: 29 MMOL/L (ref 21–32)
CREAT SERPL-MCNC: 0.63 MG/DL (ref 0.6–1.3)
DIAGNOSIS, 93000: NORMAL
DIFFERENTIAL METHOD BLD: ABNORMAL
EOSINOPHIL # BLD: 0.1 K/UL (ref 0–0.4)
EOSINOPHIL NFR BLD: 1 % (ref 0–5)
ERYTHROCYTE [DISTWIDTH] IN BLOOD BY AUTOMATED COUNT: 15.3 % (ref 11.6–14.5)
GLOBULIN SER CALC-MCNC: 3.5 G/DL (ref 2–4)
GLUCOSE SERPL-MCNC: 95 MG/DL (ref 74–99)
HCT VFR BLD AUTO: 38.9 % (ref 35–45)
HGB BLD-MCNC: 12.5 G/DL (ref 12–16)
IMM GRANULOCYTES # BLD AUTO: 0 K/UL (ref 0–0.04)
IMM GRANULOCYTES NFR BLD AUTO: 0 % (ref 0–0.5)
LYMPHOCYTES # BLD: 2.1 K/UL (ref 0.9–3.6)
LYMPHOCYTES NFR BLD: 28 % (ref 21–52)
MCH RBC QN AUTO: 28.9 PG (ref 24–34)
MCHC RBC AUTO-ENTMCNC: 32.1 G/DL (ref 31–37)
MCV RBC AUTO: 90 FL (ref 78–100)
MONOCYTES # BLD: 0.5 K/UL (ref 0.05–1.2)
MONOCYTES NFR BLD: 7 % (ref 3–10)
NEUTS SEG # BLD: 4.7 K/UL (ref 1.8–8)
NEUTS SEG NFR BLD: 63 % (ref 40–73)
NRBC # BLD: 0 K/UL (ref 0–0.01)
NRBC BLD-RTO: 0 PER 100 WBC
P-R INTERVAL, ECG05: 164 MS
PLATELET # BLD AUTO: 299 K/UL (ref 135–420)
PMV BLD AUTO: 10.7 FL (ref 9.2–11.8)
POTASSIUM SERPL-SCNC: 4.5 MMOL/L (ref 3.5–5.5)
PROT SERPL-MCNC: 7.1 G/DL (ref 6.4–8.2)
Q-T INTERVAL, ECG07: 402 MS
QRS DURATION, ECG06: 98 MS
QTC CALCULATION (BEZET), ECG08: 442 MS
RBC # BLD AUTO: 4.32 M/UL (ref 4.2–5.3)
SODIUM SERPL-SCNC: 140 MMOL/L (ref 136–145)
VENTRICULAR RATE, ECG03: 73 BPM
WBC # BLD AUTO: 7.4 K/UL (ref 4.6–13.2)

## 2023-01-24 PROCEDURE — 93005 ELECTROCARDIOGRAM TRACING: CPT

## 2023-01-24 PROCEDURE — 80053 COMPREHEN METABOLIC PANEL: CPT

## 2023-01-24 PROCEDURE — 36415 COLL VENOUS BLD VENIPUNCTURE: CPT

## 2023-01-24 PROCEDURE — 85025 COMPLETE CBC W/AUTO DIFF WBC: CPT

## 2023-01-30 ENCOUNTER — OFFICE VISIT (OUTPATIENT)
Dept: SURGERY | Age: 48
End: 2023-01-30
Payer: COMMERCIAL

## 2023-01-30 ENCOUNTER — NURSE NAVIGATOR (OUTPATIENT)
Dept: SURGERY | Age: 48
End: 2023-01-30

## 2023-01-30 ENCOUNTER — HOSPITAL ENCOUNTER (OUTPATIENT)
Dept: BARIATRICS/WEIGHT MGMT | Age: 48
Discharge: HOME | End: 2023-01-30

## 2023-01-30 VITALS
OXYGEN SATURATION: 96 % | SYSTOLIC BLOOD PRESSURE: 115 MMHG | BODY MASS INDEX: 42.36 KG/M2 | WEIGHT: 239.1 LBS | HEIGHT: 63 IN | DIASTOLIC BLOOD PRESSURE: 75 MMHG | HEART RATE: 79 BPM | TEMPERATURE: 97.1 F

## 2023-01-30 DIAGNOSIS — I10 HYPERTENSION, UNSPECIFIED TYPE: ICD-10-CM

## 2023-01-30 DIAGNOSIS — G47.33 OBSTRUCTIVE SLEEP APNEA SYNDROME: ICD-10-CM

## 2023-01-30 DIAGNOSIS — K21.00 GASTROESOPHAGEAL REFLUX DISEASE WITH ESOPHAGITIS WITHOUT HEMORRHAGE: ICD-10-CM

## 2023-01-30 DIAGNOSIS — M47.892 OTHER OSTEOARTHRITIS OF SPINE, CERVICAL REGION: ICD-10-CM

## 2023-01-30 DIAGNOSIS — E66.01 MORBID OBESITY (HCC): ICD-10-CM

## 2023-01-30 DIAGNOSIS — N20.0 KIDNEY STONES: ICD-10-CM

## 2023-01-30 DIAGNOSIS — M19.90 ARTHRITIS: ICD-10-CM

## 2023-01-30 DIAGNOSIS — G89.18 POST-OP PAIN: Primary | ICD-10-CM

## 2023-01-30 PROCEDURE — 99215 OFFICE O/P EST HI 40 MIN: CPT | Performed by: SPECIALIST

## 2023-01-30 PROCEDURE — 3078F DIAST BP <80 MM HG: CPT | Performed by: SPECIALIST

## 2023-01-30 PROCEDURE — 3074F SYST BP LT 130 MM HG: CPT | Performed by: SPECIALIST

## 2023-01-30 RX ORDER — CLONAZEPAM 1 MG/1
1 TABLET ORAL
COMMUNITY
Start: 2022-11-29

## 2023-01-30 RX ORDER — SCOLOPAMINE TRANSDERMAL SYSTEM 1 MG/1
1 PATCH, EXTENDED RELEASE TRANSDERMAL
Qty: 1 PATCH | Refills: 0 | Status: SHIPPED | OUTPATIENT
Start: 2023-01-30

## 2023-01-30 RX ORDER — VENLAFAXINE HYDROCHLORIDE 225 MG/1
225 TABLET, EXTENDED RELEASE ORAL DAILY
COMMUNITY
Start: 2019-01-01

## 2023-01-30 RX ORDER — ONDANSETRON 8 MG/1
8 TABLET, ORALLY DISINTEGRATING ORAL
Qty: 30 TABLET | Refills: 0 | Status: SHIPPED | OUTPATIENT
Start: 2023-01-30

## 2023-01-30 RX ORDER — ENOXAPARIN SODIUM 100 MG/ML
40 INJECTION SUBCUTANEOUS EVERY 12 HOURS
Qty: 20 EACH | Refills: 0 | Status: SHIPPED | OUTPATIENT
Start: 2023-01-30 | End: 2023-02-09

## 2023-01-30 RX ORDER — AZELASTINE 1 MG/ML
1 SPRAY, METERED NASAL 2 TIMES DAILY
COMMUNITY
Start: 2021-08-01

## 2023-01-30 RX ORDER — OXYCODONE AND ACETAMINOPHEN 5; 325 MG/1; MG/1
1 TABLET ORAL
Qty: 28 TABLET | Refills: 0 | Status: SHIPPED | OUTPATIENT
Start: 2023-01-30 | End: 2023-02-06

## 2023-01-30 NOTE — H&P (VIEW-ONLY)
Gastric Bypass - History and Physical    Subjective: The patient is a 56 y.o. obese female with a Body mass index is 42.35 kg/m². .   she presents now to review their work up to date to see if they are a candidate for surgery and whether or not to proceed with the previously requested procedure. Bariatric comorbidities continue to include:   Patient Active Problem List   Diagnosis Code    Morbid obesity ([facility].) E66.01    Morbid obesity with BMI of 45.0-49.9, adult ([facility]) E66.01, Z68.42    High cholesterol E78.00    Arthritis M19.90    Gallstones K80.20    GERD (gastroesophageal reflux disease) K21.9    Anxiety F41.9    Hypertension I10    Kidney stones N20.0    Obstructive sleep apnea syndrome G47.33    DJD (degenerative joint disease), cervical M47.812       They have been generally well prior to this visit and have had no recent significant illnesses. The patient has had no gastrointestinal issues that would preclude them from proceeding with the surgery they have chosen. Mariela Cruz has recently tried a preoperative weight loss program  in addition to seeing a bariatric nutritionist preoperatively. We have discussed on at least one other occasion about the various types of surgical weight loss procedures and they have considered these options after our initial consultation. We have once again discussed these procedures in detail and they have now decided on a surgical procedure. They present today to discuss this and confirm that their evaluation pre operatively is acceptable to continue with surgery. The patient desires laparoscopic gastric bypass surgery for surgical weight loss. The patients goal weight is 150lb. These goals are consistent with expected outcomes of their desired operation. her Medical goals are resolution of these health issues.     Patient Active Problem List    Diagnosis Date Noted    Morbid obesity ([facility] 09/19/2022    Morbid obesity with BMI of 45.0-49.9, adult ([facility].) 09/19/2022 High cholesterol 09/19/2022    Arthritis 09/19/2022    Gallstones 09/19/2022    GERD (gastroesophageal reflux disease) 09/19/2022    Anxiety 09/19/2022    Kidney stones 09/19/2022    DJD (degenerative joint disease), cervical 09/19/2022    Obstructive sleep apnea syndrome 2020    Hypertension 2017      Past Surgical History:   Procedure Laterality Date    HX ACL RECONSTRUCTION      HX HYSTERECTOMY        Social History     Tobacco Use    Smoking status: Never    Smokeless tobacco: Never   Substance Use Topics    Alcohol use: Yes     Comment: social      Family History   Problem Relation Age of Onset    Immune Disorder Mother     No Known Problems Father     Hypertension Sister       Current Outpatient Medications   Medication Sig Dispense Refill    azelastine (ASTELIN) 137 mcg (0.1 %) nasal spray       clonazePAM (KlonoPIN) 1 mg tablet Take 1 mg by mouth nightly as needed. Venlafaxine-ER 24 HR (EFFEXOR-ER) 225 mg tablet       amLODIPine (NORVASC) 5 mg tablet Take 5 mg by mouth nightly. ARIPiprazole (ABILIFY) 2 mg tablet Take 2 mg by mouth daily. cetirizine (ZYRTEC) 5 mg tablet Take 5 mg by mouth as needed. fluticasone propionate (FLONASE) 50 mcg/actuation nasal spray 1 [place] by Nasal route daily. pantoprazole (PROTONIX) 40 mg tablet 1 Tablet daily. propranoloL (INDERAL) 10 mg tablet 1 Tablet two (2) times a day. rosuvastatin (CRESTOR) 10 mg tablet Take 10 mg by mouth nightly. enoxaparin (LOVENOX) 40 mg/0.4 mL 0.4 mL by SubCUTAneous route every twelve (12) hours for 10 days. Indications: deep vein thrombosis prevention (Patient not taking: Reported on 1/30/2023) 20 Each 0    ondansetron (ZOFRAN ODT) 8 mg disintegrating tablet Take 1 Tablet by mouth every eight (8) hours as needed for Nausea or Vomiting.  (Patient not taking: Reported on 1/30/2023) 30 Tablet 0     Allergies   Allergen Reactions    Cephalosporins Hives          Review of Systems:          General - No history or complaints of unexpected fever, chills, or weight loss  Head/Neck - No history or complaints of headache, diplopia, dysphagia, hearing loss  Cardiac - No history or complaints of chest pain, palpitations, murmur, or shortness of breath  Pulmonary - No history or complaints of shortness of breath, productive cough, hemoptysis  Gastrointestinal - moderate to severe reflux,no  abdominal pain, obstipation/constipation or blood per rectum  Genitourinary - No history or complaints of hematuria/dysuria, stress urinary incontinence symptoms, or renal lithiasis  Musculoskeletal - moderate joint pain in their knees, back and neck,  no muscular weakness  Hematologic - No history or complaints of bleeding disorders,  No blood transfusions  Neurologic - No history or complaints of  migraine headaches, seizure activity, syncopal episodes, TIA or stroke  Integumentary - No history or complaints of rashes, abnormal nevi, skin cancer  Gynecological -n/a             Objective:   Visit Vitals  /75 (BP 1 Location: Right upper arm, BP Patient Position: Sitting, BP Cuff Size: Large adult long)   Pulse 79   Temp 97.1 °F (36.2 °C)   Ht 5' 3\" (1.6 m)   Wt 108.5 kg (239 lb 1.6 oz)   SpO2 96%   BMI 42.35 kg/m²       Physical Examination: General appearance - alert, well appearing, and in no distress and oriented to person, place, and time  Mental status - alert, oriented to person, place, and time, normal mood, behavior, speech, dress, motor activity, and thought processes  Eyes - pupils equal and reactive, extraocular eye movements intact, sclera anicteric, left eye normal, right eye normal  Ears - right ear normal, left ear normal  Neck - supple, no significant adenopathy  Chest - clear to auscultation, no wheezes, rales or rhonchi, symmetric air entry  Heart - normal rate, regular rhythm, normal S1, S2, no murmurs, rubs, clicks or gallops  Abdomen - soft, nontender, nondistended, no masses or organomegaly  Back exam - full range of motion, no tenderness, palpable spasm or pain on motion  Neurological - alert, oriented, normal speech, no focal findings or movement disorder noted  Musculoskeletal - no joint tenderness, deformity or swelling  Extremities - peripheral pulses normal, no pedal edema, no clubbing or cyanosis    Labs :     Lab Results   Component Value Date/Time    WBC 7.4 01/24/2023 01:10 PM    HGB 12.5 01/24/2023 01:10 PM    HCT 38.9 01/24/2023 01:10 PM    PLATELET 014 96/09/0624 01:10 PM    MCV 90.0 01/24/2023 01:10 PM     Lab Results   Component Value Date/Time    Sodium 140 01/24/2023 01:10 PM    Potassium 4.5 01/24/2023 01:10 PM    Chloride 106 01/24/2023 01:10 PM    CO2 29 01/24/2023 01:10 PM    Anion gap 5 01/24/2023 01:10 PM    Glucose 95 01/24/2023 01:10 PM    BUN 19 (H) 01/24/2023 01:10 PM    Creatinine 0.63 01/24/2023 01:10 PM    BUN/Creatinine ratio 30 (H) 01/24/2023 01:10 PM    Calcium 9.1 01/24/2023 01:10 PM    Bilirubin, total 0.3 01/24/2023 01:10 PM    Alk. phosphatase 81 01/24/2023 01:10 PM    Protein, total 7.1 01/24/2023 01:10 PM    Albumin 3.6 01/24/2023 01:10 PM    Globulin 3.5 01/24/2023 01:10 PM    A-G Ratio 1.0 01/24/2023 01:10 PM    ALT (SGPT) 21 01/24/2023 01:10 PM     No results found for: IRON, FE, TIBC, IBCT, PSAT, FERR  No results found for: FOL, RBCF  No results found for: VITD3, XQVID2, XQVID3, XQVID, VD3RIA            Cardiac / Pulmonary Evaluation:          UGI Results:         Assessment:     Morbid obesity with associated comorbidity    Plan:     laparoscopic gastric bypass surgery    This is a 56 y.o. female with a BMI of Body mass index is 42.35 kg/m². and the weight-related co-morbidties as noted above. Nanetta Falter meets the NIH criteria for bariatric surgery based upon the BMI of Body mass index is 42.35 kg/m². and   multiple weight-related co-morbidties.  Josephine Reese has elected laparoscopic gastric bypass as her intervention of choice for treatment of morbid obestiy through surgical means secondary to its   uniform results,  profound baseline suppression of hunger and pace at which weight is lost.    In the office today, following Melissa's history and physical examination, a 40 minute discussion regarding the anatomic alterations for the laparoscopic gastric bypass  was undertaken. The dietary   expectations and the patient  dependent factors for success were thoroughly discussed, to include the need for interval follow-up and long-term dietary changes associated with success. The possible short   and long term  complications of the gastric bypass were also discussed, to include but not limited to;death, DVT/PE, staple line leak, bleeding, stricture formation, infection,internal hernia  and pouch dilation. Specific weight related outcomes for success were also discussed with an emphasis on careful and close follow-up with the first year and Dietary behavior modification over the first years as baseline cyclical   hunger returns  The patient expressed an understanding of the above factors, and her questions were answered in their entirety. In addition, the patient attended a 1.5 hour power point seminar or online seminar regarding obesity, surgical weight loss including, adjustable gastric band, gastric bypass, and sleeve gastrectomy. This discussion contrasted   the different surgical techniques, mechanisms of actions and expected outcomes, and surgical and medical risks associated with each procedure. During the in office seminar, there was a long question and answer   session where each questions was answered until there were no additional questions. Today, the patient had all of her questions answered and the decision was made today that the patient's preoperative evaluation is acceptable for them  to proceed with bariatric surgery  choosing  gastric bypass as her surgical option.          Secondary Diagnoses:     DVT / Pulmonary Embolus Risk - The patient is at a higher risk for post operative DVT / pulmonary embolus secondary to their morbid obese status, relative sedentary   lifestyle, and impending general anesthetic. We will plan to use anticoagulation therapy pre and post operative as well as TEDs and  pneumatic compression devices and   encourage ambulation once on the hospital nursing floor. The need for  at home anticoagulation therapy has also been discussed. The patient understands   that their efforts at ambulation are of vital importance to reduce the risk of this complication thus placing significant burden on them as to the prevention of such issues. Signs and symptoms of DVT / PE have been discussed with the patient and they have been instructed to call the office if any of these occur in the \"at home\" post op phase. The patient has been provided with a post operative prescription of Lovenox and instructed in its use for DVT prophylaxis. GERD -The patient understands that weight loss surgery is not a guaranteed cure for reflux disease but does understand the benefits that weight loss can have on reflux disease. They also understand that at the time of surgery the gastroesophageal junction will be evaluated for the presence of a diaphragmatic hernia. Hernias will be corrected always with the surgical procedure if possible and is deemed safe. The patient also understands that neither weight loss surgery nor repair of a diaphragmatic hernia repair guarantees the complete cessation of the disease. They also understand there is a possibility of recurrence of these hernias with a simple crural repair as is performed with these procedures. They understand they may have to continue their medications in the postoperative period. They have a good understanding that the gastric bypass procedure is better suited to total resolution of this issue and that neither the Lap Band or sleeve gastrectomy is considered a curative procedure as it pertains to this diagnosis.      Hypertension - the patient understands, and we have discussed in detail, that this is an active acute health problem that has a multifactorial effect on their body from the standpoint of healing. They understand that uncontrolled hypertension affects other organ processes and this leads to risk associated with surgical procedures. They understand that in addition to hypertension, once they develop other health issues, their risk is exponentially worse. Currently they understand that this likely one of the single most important items that they need to control in the perioperative process. They understand that the hospitals protocol is that patients entering the operative suite should have a blood pressure reading less than 160/90 prior to surgery. Elevated readings today in office above 160/90 are recommended emergent PCP follow-up or if symptomatic to proceed to the ED. The patient has a clear understanding of how weight loss improves hypertension as a whole, but also they understand that there is a significant genetic component to this disease process. We will monitor the patients blood pressure while in the hospital and the plan would be to continue those medications postoperatively. If a diuretic is being used we will stop them on discharge to prevent dehydration particularly with the sleeve gastrectomy and the gastric bypass procedures. They will be instructed to monitor their blood pressure postoperatively while at home and notify their primary care physician in the event of any significantly high or uncharacteristic readings. Hyperlipidemia - The patient understands that studies show that almost all patient will realize an improvement in their lipid profile with weight loss that occurs with these procedures. They however also understand that hyperlipidemia is a multifactorial disease particularly as it pertains to their genetic background and that there is no guarantee toward cure  of this   issue.  We will resume their medications both pre operatively and immediately postoperatively as this tends to decrease any post operative cardiac events. The patient will follow up   with their family physician in the postoperative period with plans to repeat their lipid panel 2-3 month postoperative for potential adjustment or removal of these medications. Signed By: Lisa Coleman MD     January 30, 2023           I spent a total of 45 minutes providing this service to the patient today to include discussing their surgical choice, reviewing their work-up to date, and performing a full history and physical examination.

## 2023-01-30 NOTE — PROGRESS NOTES
Gastric Bypass - History and Physical    Subjective: The patient is a 56 y.o. obese female with a Body mass index is 42.35 kg/m². .   she presents now to review their work up to date to see if they are a candidate for surgery and whether or not to proceed with the previously requested procedure. Bariatric comorbidities continue to include:   Patient Active Problem List   Diagnosis Code    Morbid obesity ([facility] E66.01    Morbid obesity with BMI of 45.0-49.9, adult ([facility]) E66.01, Z68.42    High cholesterol E78.00    Arthritis M19.90    Gallstones K80.20    GERD (gastroesophageal reflux disease) K21.9    Anxiety F41.9    Hypertension I10    Kidney stones N20.0    Obstructive sleep apnea syndrome G47.33    DJD (degenerative joint disease), cervical M47.812       They have been generally well prior to this visit and have had no recent significant illnesses. The patient has had no gastrointestinal issues that would preclude them from proceeding with the surgery they have chosen. Emilie Dupree has recently tried a preoperative weight loss program  in addition to seeing a bariatric nutritionist preoperatively. We have discussed on at least one other occasion about the various types of surgical weight loss procedures and they have considered these options after our initial consultation. We have once again discussed these procedures in detail and they have now decided on a surgical procedure. They present today to discuss this and confirm that their evaluation pre operatively is acceptable to continue with surgery. The patient desires laparoscopic gastric bypass surgery for surgical weight loss. The patients goal weight is 150lb. These goals are consistent with expected outcomes of their desired operation. her Medical goals are resolution of these health issues.     Patient Active Problem List    Diagnosis Date Noted    Morbid obesity ([facility] 09/19/2022    Morbid obesity with BMI of 45.0-49.9, adult ([facility] 09/19/2022 High cholesterol 09/19/2022    Arthritis 09/19/2022    Gallstones 09/19/2022    GERD (gastroesophageal reflux disease) 09/19/2022    Anxiety 09/19/2022    Kidney stones 09/19/2022    DJD (degenerative joint disease), cervical 09/19/2022    Obstructive sleep apnea syndrome 2020    Hypertension 2017      Past Surgical History:   Procedure Laterality Date    HX ACL RECONSTRUCTION      HX HYSTERECTOMY        Social History     Tobacco Use    Smoking status: Never    Smokeless tobacco: Never   Substance Use Topics    Alcohol use: Yes     Comment: social      Family History   Problem Relation Age of Onset    Immune Disorder Mother     No Known Problems Father     Hypertension Sister       Current Outpatient Medications   Medication Sig Dispense Refill    azelastine (ASTELIN) 137 mcg (0.1 %) nasal spray       clonazePAM (KlonoPIN) 1 mg tablet Take 1 mg by mouth nightly as needed. Venlafaxine-ER 24 HR (EFFEXOR-ER) 225 mg tablet       amLODIPine (NORVASC) 5 mg tablet Take 5 mg by mouth nightly. ARIPiprazole (ABILIFY) 2 mg tablet Take 2 mg by mouth daily. cetirizine (ZYRTEC) 5 mg tablet Take 5 mg by mouth as needed. fluticasone propionate (FLONASE) 50 mcg/actuation nasal spray 1 [place] by Nasal route daily. pantoprazole (PROTONIX) 40 mg tablet 1 Tablet daily. propranoloL (INDERAL) 10 mg tablet 1 Tablet two (2) times a day. rosuvastatin (CRESTOR) 10 mg tablet Take 10 mg by mouth nightly. enoxaparin (LOVENOX) 40 mg/0.4 mL 0.4 mL by SubCUTAneous route every twelve (12) hours for 10 days. Indications: deep vein thrombosis prevention (Patient not taking: Reported on 1/30/2023) 20 Each 0    ondansetron (ZOFRAN ODT) 8 mg disintegrating tablet Take 1 Tablet by mouth every eight (8) hours as needed for Nausea or Vomiting.  (Patient not taking: Reported on 1/30/2023) 30 Tablet 0     Allergies   Allergen Reactions    Cephalosporins Hives          Review of Systems:          General - No history or complaints of unexpected fever, chills, or weight loss  Head/Neck - No history or complaints of headache, diplopia, dysphagia, hearing loss  Cardiac - No history or complaints of chest pain, palpitations, murmur, or shortness of breath  Pulmonary - No history or complaints of shortness of breath, productive cough, hemoptysis  Gastrointestinal - moderate to severe reflux,no  abdominal pain, obstipation/constipation or blood per rectum  Genitourinary - No history or complaints of hematuria/dysuria, stress urinary incontinence symptoms, or renal lithiasis  Musculoskeletal - moderate joint pain in their knees, back and neck,  no muscular weakness  Hematologic - No history or complaints of bleeding disorders,  No blood transfusions  Neurologic - No history or complaints of  migraine headaches, seizure activity, syncopal episodes, TIA or stroke  Integumentary - No history or complaints of rashes, abnormal nevi, skin cancer  Gynecological -n/a             Objective:   Visit Vitals  /75 (BP 1 Location: Right upper arm, BP Patient Position: Sitting, BP Cuff Size: Large adult long)   Pulse 79   Temp 97.1 °F (36.2 °C)   Ht 5' 3\" (1.6 m)   Wt 108.5 kg (239 lb 1.6 oz)   SpO2 96%   BMI 42.35 kg/m²       Physical Examination: General appearance - alert, well appearing, and in no distress and oriented to person, place, and time  Mental status - alert, oriented to person, place, and time, normal mood, behavior, speech, dress, motor activity, and thought processes  Eyes - pupils equal and reactive, extraocular eye movements intact, sclera anicteric, left eye normal, right eye normal  Ears - right ear normal, left ear normal  Neck - supple, no significant adenopathy  Chest - clear to auscultation, no wheezes, rales or rhonchi, symmetric air entry  Heart - normal rate, regular rhythm, normal S1, S2, no murmurs, rubs, clicks or gallops  Abdomen - soft, nontender, nondistended, no masses or organomegaly  Back exam - full range of motion, no tenderness, palpable spasm or pain on motion  Neurological - alert, oriented, normal speech, no focal findings or movement disorder noted  Musculoskeletal - no joint tenderness, deformity or swelling  Extremities - peripheral pulses normal, no pedal edema, no clubbing or cyanosis    Labs :     Lab Results   Component Value Date/Time    WBC 7.4 01/24/2023 01:10 PM    HGB 12.5 01/24/2023 01:10 PM    HCT 38.9 01/24/2023 01:10 PM    PLATELET 243 14/64/6385 01:10 PM    MCV 90.0 01/24/2023 01:10 PM     Lab Results   Component Value Date/Time    Sodium 140 01/24/2023 01:10 PM    Potassium 4.5 01/24/2023 01:10 PM    Chloride 106 01/24/2023 01:10 PM    CO2 29 01/24/2023 01:10 PM    Anion gap 5 01/24/2023 01:10 PM    Glucose 95 01/24/2023 01:10 PM    BUN 19 (H) 01/24/2023 01:10 PM    Creatinine 0.63 01/24/2023 01:10 PM    BUN/Creatinine ratio 30 (H) 01/24/2023 01:10 PM    Calcium 9.1 01/24/2023 01:10 PM    Bilirubin, total 0.3 01/24/2023 01:10 PM    Alk. phosphatase 81 01/24/2023 01:10 PM    Protein, total 7.1 01/24/2023 01:10 PM    Albumin 3.6 01/24/2023 01:10 PM    Globulin 3.5 01/24/2023 01:10 PM    A-G Ratio 1.0 01/24/2023 01:10 PM    ALT (SGPT) 21 01/24/2023 01:10 PM     No results found for: IRON, FE, TIBC, IBCT, PSAT, FERR  No results found for: FOL, RBCF  No results found for: VITD3, XQVID2, XQVID3, XQVID, VD3RIA            Cardiac / Pulmonary Evaluation:          UGI Results:         Assessment:     Morbid obesity with associated comorbidity    Plan:     laparoscopic gastric bypass surgery    This is a 56 y.o. female with a BMI of Body mass index is 42.35 kg/m². and the weight-related co-morbidties as noted above. Britta Bangura meets the NIH criteria for bariatric surgery based upon the BMI of Body mass index is 42.35 kg/m². and   multiple weight-related co-morbidties.  Britta Baigas has elected laparoscopic gastric bypass as her intervention of choice for treatment of morbid obestiy through surgical means secondary to its   uniform results,  profound baseline suppression of hunger and pace at which weight is lost.    In the office today, following Melissa's history and physical examination, a 40 minute discussion regarding the anatomic alterations for the laparoscopic gastric bypass  was undertaken. The dietary   expectations and the patient  dependent factors for success were thoroughly discussed, to include the need for interval follow-up and long-term dietary changes associated with success. The possible short   and long term  complications of the gastric bypass were also discussed, to include but not limited to;death, DVT/PE, staple line leak, bleeding, stricture formation, infection,internal hernia  and pouch dilation. Specific weight related outcomes for success were also discussed with an emphasis on careful and close follow-up with the first year and Dietary behavior modification over the first years as baseline cyclical   hunger returns  The patient expressed an understanding of the above factors, and her questions were answered in their entirety. In addition, the patient attended a 1.5 hour power point seminar or online seminar regarding obesity, surgical weight loss including, adjustable gastric band, gastric bypass, and sleeve gastrectomy. This discussion contrasted   the different surgical techniques, mechanisms of actions and expected outcomes, and surgical and medical risks associated with each procedure. During the in office seminar, there was a long question and answer   session where each questions was answered until there were no additional questions. Today, the patient had all of her questions answered and the decision was made today that the patient's preoperative evaluation is acceptable for them  to proceed with bariatric surgery  choosing  gastric bypass as her surgical option.          Secondary Diagnoses:     DVT / Pulmonary Embolus Risk - The patient is at a higher risk for post operative DVT / pulmonary embolus secondary to their morbid obese status, relative sedentary   lifestyle, and impending general anesthetic. We will plan to use anticoagulation therapy pre and post operative as well as TEDs and  pneumatic compression devices and   encourage ambulation once on the hospital nursing floor. The need for  at home anticoagulation therapy has also been discussed. The patient understands   that their efforts at ambulation are of vital importance to reduce the risk of this complication thus placing significant burden on them as to the prevention of such issues. Signs and symptoms of DVT / PE have been discussed with the patient and they have been instructed to call the office if any of these occur in the \"at home\" post op phase. The patient has been provided with a post operative prescription of Lovenox and instructed in its use for DVT prophylaxis. GERD -The patient understands that weight loss surgery is not a guaranteed cure for reflux disease but does understand the benefits that weight loss can have on reflux disease. They also understand that at the time of surgery the gastroesophageal junction will be evaluated for the presence of a diaphragmatic hernia. Hernias will be corrected always with the surgical procedure if possible and is deemed safe. The patient also understands that neither weight loss surgery nor repair of a diaphragmatic hernia repair guarantees the complete cessation of the disease. They also understand there is a possibility of recurrence of these hernias with a simple crural repair as is performed with these procedures. They understand they may have to continue their medications in the postoperative period. They have a good understanding that the gastric bypass procedure is better suited to total resolution of this issue and that neither the Lap Band or sleeve gastrectomy is considered a curative procedure as it pertains to this diagnosis.      Hypertension - the patient understands, and we have discussed in detail, that this is an active acute health problem that has a multifactorial effect on their body from the standpoint of healing. They understand that uncontrolled hypertension affects other organ processes and this leads to risk associated with surgical procedures. They understand that in addition to hypertension, once they develop other health issues, their risk is exponentially worse. Currently they understand that this likely one of the single most important items that they need to control in the perioperative process. They understand that the hospitals protocol is that patients entering the operative suite should have a blood pressure reading less than 160/90 prior to surgery. Elevated readings today in office above 160/90 are recommended emergent PCP follow-up or if symptomatic to proceed to the ED. The patient has a clear understanding of how weight loss improves hypertension as a whole, but also they understand that there is a significant genetic component to this disease process. We will monitor the patients blood pressure while in the hospital and the plan would be to continue those medications postoperatively. If a diuretic is being used we will stop them on discharge to prevent dehydration particularly with the sleeve gastrectomy and the gastric bypass procedures. They will be instructed to monitor their blood pressure postoperatively while at home and notify their primary care physician in the event of any significantly high or uncharacteristic readings. Hyperlipidemia - The patient understands that studies show that almost all patient will realize an improvement in their lipid profile with weight loss that occurs with these procedures. They however also understand that hyperlipidemia is a multifactorial disease particularly as it pertains to their genetic background and that there is no guarantee toward cure  of this   issue.  We will resume their medications both pre operatively and immediately postoperatively as this tends to decrease any post operative cardiac events. The patient will follow up   with their family physician in the postoperative period with plans to repeat their lipid panel 2-3 month postoperative for potential adjustment or removal of these medications. Signed By: Danya Canseco MD     January 30, 2023           I spent a total of 45 minutes providing this service to the patient today to include discussing their surgical choice, reviewing their work-up to date, and performing a full history and physical examination.

## 2023-01-30 NOTE — H&P (VIEW-ONLY)
Gastric Bypass - History and Physical    Subjective: The patient is a 56 y.o. obese female with a Body mass index is 42.35 kg/m². .   she presents now to review their work up to date to see if they are a candidate for surgery and whether or not to proceed with the previously requested procedure. Bariatric comorbidities continue to include:   Patient Active Problem List   Diagnosis Code    Morbid obesity ([facility] E66.01    Morbid obesity with BMI of 45.0-49.9, adult ([facility]) E66.01, Z68.42    High cholesterol E78.00    Arthritis M19.90    Gallstones K80.20    GERD (gastroesophageal reflux disease) K21.9    Anxiety F41.9    Hypertension I10    Kidney stones N20.0    Obstructive sleep apnea syndrome G47.33    DJD (degenerative joint disease), cervical M47.812       They have been generally well prior to this visit and have had no recent significant illnesses. The patient has had no gastrointestinal issues that would preclude them from proceeding with the surgery they have chosen. Henry Leonard has recently tried a preoperative weight loss program  in addition to seeing a bariatric nutritionist preoperatively. We have discussed on at least one other occasion about the various types of surgical weight loss procedures and they have considered these options after our initial consultation. We have once again discussed these procedures in detail and they have now decided on a surgical procedure. They present today to discuss this and confirm that their evaluation pre operatively is acceptable to continue with surgery. The patient desires laparoscopic gastric bypass surgery for surgical weight loss. The patients goal weight is 150lb. These goals are consistent with expected outcomes of their desired operation. her Medical goals are resolution of these health issues.     Patient Active Problem List    Diagnosis Date Noted    [facility] 09/19/2022    Morbid obesity with BMI of 45.0-49.9, adult ([facility] 09/19/2022 High cholesterol 09/19/2022    Arthritis 09/19/2022    Gallstones 09/19/2022    GERD (gastroesophageal reflux disease) 09/19/2022    Anxiety 09/19/2022    Kidney stones 09/19/2022    DJD (degenerative joint disease), cervical 09/19/2022    Obstructive sleep apnea syndrome 2020    Hypertension 2017      Past Surgical History:   Procedure Laterality Date    HX ACL RECONSTRUCTION      HX HYSTERECTOMY        Social History     Tobacco Use    Smoking status: Never    Smokeless tobacco: Never   Substance Use Topics    Alcohol use: Yes     Comment: social      Family History   Problem Relation Age of Onset    Immune Disorder Mother     No Known Problems Father     Hypertension Sister       Current Outpatient Medications   Medication Sig Dispense Refill    azelastine (ASTELIN) 137 mcg (0.1 %) nasal spray       clonazePAM (KlonoPIN) 1 mg tablet Take 1 mg by mouth nightly as needed. Venlafaxine-ER 24 HR (EFFEXOR-ER) 225 mg tablet       amLODIPine (NORVASC) 5 mg tablet Take 5 mg by mouth nightly. ARIPiprazole (ABILIFY) 2 mg tablet Take 2 mg by mouth daily. cetirizine (ZYRTEC) 5 mg tablet Take 5 mg by mouth as needed. fluticasone propionate (FLONASE) 50 mcg/actuation nasal spray 1 [place] by Nasal route daily. pantoprazole (PROTONIX) 40 mg tablet 1 Tablet daily. propranoloL (INDERAL) 10 mg tablet 1 Tablet two (2) times a day. rosuvastatin (CRESTOR) 10 mg tablet Take 10 mg by mouth nightly. enoxaparin (LOVENOX) 40 mg/0.4 mL 0.4 mL by SubCUTAneous route every twelve (12) hours for 10 days. Indications: deep vein thrombosis prevention (Patient not taking: Reported on 1/30/2023) 20 Each 0    ondansetron (ZOFRAN ODT) 8 mg disintegrating tablet Take 1 Tablet by mouth every eight (8) hours as needed for Nausea or Vomiting.  (Patient not taking: Reported on 1/30/2023) 30 Tablet 0     Allergies   Allergen Reactions    Cephalosporins Hives          Review of Systems:          General - No history or complaints of unexpected fever, chills, or weight loss  Head/Neck - No history or complaints of headache, diplopia, dysphagia, hearing loss  Cardiac - No history or complaints of chest pain, palpitations, murmur, or shortness of breath  Pulmonary - No history or complaints of shortness of breath, productive cough, hemoptysis  Gastrointestinal - moderate to severe reflux,no  abdominal pain, obstipation/constipation or blood per rectum  Genitourinary - No history or complaints of hematuria/dysuria, stress urinary incontinence symptoms, or renal lithiasis  Musculoskeletal - moderate joint pain in their knees, back and neck,  no muscular weakness  Hematologic - No history or complaints of bleeding disorders,  No blood transfusions  Neurologic - No history or complaints of  migraine headaches, seizure activity, syncopal episodes, TIA or stroke  Integumentary - No history or complaints of rashes, abnormal nevi, skin cancer  Gynecological -n/a             Objective:   Visit Vitals  /75 (BP 1 Location: Right upper arm, BP Patient Position: Sitting, BP Cuff Size: Large adult long)   Pulse 79   Temp 97.1 °F (36.2 °C)   Ht 5' 3\" (1.6 m)   Wt 108.5 kg (239 lb 1.6 oz)   SpO2 96%   BMI 42.35 kg/m²       Physical Examination: General appearance - alert, well appearing, and in no distress and oriented to person, place, and time  Mental status - alert, oriented to person, place, and time, normal mood, behavior, speech, dress, motor activity, and thought processes  Eyes - pupils equal and reactive, extraocular eye movements intact, sclera anicteric, left eye normal, right eye normal  Ears - right ear normal, left ear normal  Neck - supple, no significant adenopathy  Chest - clear to auscultation, no wheezes, rales or rhonchi, symmetric air entry  Heart - normal rate, regular rhythm, normal S1, S2, no murmurs, rubs, clicks or gallops  Abdomen - soft, nontender, nondistended, no masses or organomegaly  Back exam - full range of motion, no tenderness, palpable spasm or pain on motion  Neurological - alert, oriented, normal speech, no focal findings or movement disorder noted  Musculoskeletal - no joint tenderness, deformity or swelling  Extremities - peripheral pulses normal, no pedal edema, no clubbing or cyanosis    Labs :     Lab Results   Component Value Date/Time    WBC 7.4 01/24/2023 01:10 PM    HGB 12.5 01/24/2023 01:10 PM    HCT 38.9 01/24/2023 01:10 PM    PLATELET 437 77/94/5233 01:10 PM    MCV 90.0 01/24/2023 01:10 PM     Lab Results   Component Value Date/Time    Sodium 140 01/24/2023 01:10 PM    Potassium 4.5 01/24/2023 01:10 PM    Chloride 106 01/24/2023 01:10 PM    CO2 29 01/24/2023 01:10 PM    Anion gap 5 01/24/2023 01:10 PM    Glucose 95 01/24/2023 01:10 PM    BUN 19 (H) 01/24/2023 01:10 PM    Creatinine 0.63 01/24/2023 01:10 PM    BUN/Creatinine ratio 30 (H) 01/24/2023 01:10 PM    Calcium 9.1 01/24/2023 01:10 PM    Bilirubin, total 0.3 01/24/2023 01:10 PM    Alk. phosphatase 81 01/24/2023 01:10 PM    Protein, total 7.1 01/24/2023 01:10 PM    Albumin 3.6 01/24/2023 01:10 PM    Globulin 3.5 01/24/2023 01:10 PM    A-G Ratio 1.0 01/24/2023 01:10 PM    ALT (SGPT) 21 01/24/2023 01:10 PM     No results found for: IRON, FE, TIBC, IBCT, PSAT, FERR  No results found for: FOL, RBCF  No results found for: VITD3, XQVID2, XQVID3, XQVID, VD3RIA            Cardiac / Pulmonary Evaluation:          UGI Results:         Assessment:     Morbid obesity with associated comorbidity    Plan:     laparoscopic gastric bypass surgery    This is a 56 y.o. female with a BMI of Body mass index is 42.35 kg/m². and the weight-related co-morbidties as noted above. Matthew Palacio meets the NIH criteria for bariatric surgery based upon the BMI of Body mass index is 42.35 kg/m². and   multiple weight-related co-morbidties.  Matthew Palacio has elected laparoscopic gastric bypass as her intervention of choice for treatment of morbid obestiy through surgical means secondary to its   uniform results,  profound baseline suppression of hunger and pace at which weight is lost.    In the office today, following Melissa's history and physical examination, a 40 minute discussion regarding the anatomic alterations for the laparoscopic gastric bypass  was undertaken. The dietary   expectations and the patient  dependent factors for success were thoroughly discussed, to include the need for interval follow-up and long-term dietary changes associated with success. The possible short   and long term  complications of the gastric bypass were also discussed, to include but not limited to;death, DVT/PE, staple line leak, bleeding, stricture formation, infection,internal hernia  and pouch dilation. Specific weight related outcomes for success were also discussed with an emphasis on careful and close follow-up with the first year and Dietary behavior modification over the first years as baseline cyclical   hunger returns  The patient expressed an understanding of the above factors, and her questions were answered in their entirety. In addition, the patient attended a 1.5 hour power point seminar or online seminar regarding obesity, surgical weight loss including, adjustable gastric band, gastric bypass, and sleeve gastrectomy. This discussion contrasted   the different surgical techniques, mechanisms of actions and expected outcomes, and surgical and medical risks associated with each procedure. During the in office seminar, there was a long question and answer   session where each questions was answered until there were no additional questions. Today, the patient had all of her questions answered and the decision was made today that the patient's preoperative evaluation is acceptable for them  to proceed with bariatric surgery  choosing  gastric bypass as her surgical option.          Secondary Diagnoses:     DVT / Pulmonary Embolus Risk - The patient is at a higher risk for post operative DVT / pulmonary embolus secondary to their morbid obese status, relative sedentary   lifestyle, and impending general anesthetic. We will plan to use anticoagulation therapy pre and post operative as well as TEDs and  pneumatic compression devices and   encourage ambulation once on the hospital nursing floor. The need for  at home anticoagulation therapy has also been discussed. The patient understands   that their efforts at ambulation are of vital importance to reduce the risk of this complication thus placing significant burden on them as to the prevention of such issues. Signs and symptoms of DVT / PE have been discussed with the patient and they have been instructed to call the office if any of these occur in the \"at home\" post op phase. The patient has been provided with a post operative prescription of Lovenox and instructed in its use for DVT prophylaxis. GERD -The patient understands that weight loss surgery is not a guaranteed cure for reflux disease but does understand the benefits that weight loss can have on reflux disease. They also understand that at the time of surgery the gastroesophageal junction will be evaluated for the presence of a diaphragmatic hernia. Hernias will be corrected always with the surgical procedure if possible and is deemed safe. The patient also understands that neither weight loss surgery nor repair of a diaphragmatic hernia repair guarantees the complete cessation of the disease. They also understand there is a possibility of recurrence of these hernias with a simple crural repair as is performed with these procedures. They understand they may have to continue their medications in the postoperative period. They have a good understanding that the gastric bypass procedure is better suited to total resolution of this issue and that neither the Lap Band or sleeve gastrectomy is considered a curative procedure as it pertains to this diagnosis.      Hypertension - the patient understands, and we have discussed in detail, that this is an active acute health problem that has a multifactorial effect on their body from the standpoint of healing. They understand that uncontrolled hypertension affects other organ processes and this leads to risk associated with surgical procedures. They understand that in addition to hypertension, once they develop other health issues, their risk is exponentially worse. Currently they understand that this likely one of the single most important items that they need to control in the perioperative process. They understand that the hospitals protocol is that patients entering the operative suite should have a blood pressure reading less than 160/90 prior to surgery. Elevated readings today in office above 160/90 are recommended emergent PCP follow-up or if symptomatic to proceed to the ED. The patient has a clear understanding of how weight loss improves hypertension as a whole, but also they understand that there is a significant genetic component to this disease process. We will monitor the patients blood pressure while in the hospital and the plan would be to continue those medications postoperatively. If a diuretic is being used we will stop them on discharge to prevent dehydration particularly with the sleeve gastrectomy and the gastric bypass procedures. They will be instructed to monitor their blood pressure postoperatively while at home and notify their primary care physician in the event of any significantly high or uncharacteristic readings. Hyperlipidemia - The patient understands that studies show that almost all patient will realize an improvement in their lipid profile with weight loss that occurs with these procedures. They however also understand that hyperlipidemia is a multifactorial disease particularly as it pertains to their genetic background and that there is no guarantee toward cure  of this   issue.  We will resume their medications both pre operatively and immediately postoperatively as this tends to decrease any post operative cardiac events. The patient will follow up   with their family physician in the postoperative period with plans to repeat their lipid panel 2-3 month postoperative for potential adjustment or removal of these medications. Signed By: Cyndi Riley MD     January 30, 2023           I spent a total of 45 minutes providing this service to the patient today to include discussing their surgical choice, reviewing their work-up to date, and performing a full history and physical examination.

## 2023-01-30 NOTE — PROGRESS NOTES
CLINICAL NUTRITION PRE-OPERATIVE EDUCATION    Patient's Name: Valentino Simon   Age: 56 y.o. YOB: 1975   Sex: female    Education & Materials Provided - Post-op Binder to include:  Liquid Diet Shopping List   Supplemental Resource Guide: MVI, Vitamin B12, Calcium Citrate, Vitamin D, Vitamin B50, and Iron recommendations  Protein Supplement Information   Fluid Requirements/No Straws  No Caffeine or Carbonation   No Alcohol                               No Snacks or No Concentrated Sweets                                   Exercise Guidelines   Key Diet Principles                            Addressed Current Habits/Changes to Make   Patient was instructed on clear liquid diet guidelines to follow for one (1) day prior to surgery. Patient has been educated on the liquid diet to begin day 2 post-op and continue for 2 weeks  Patient understands the timeline for diet progression and the need to remain on full liquid diet until advanced by provider and dietitian. Summary:  Patient has completed the required visits with the Registered Dietitian. During these nutrition visits, we focused on dietary changes, behavior modifications, and the importance of establishing an exercise routine. The pre-op diet protocol that patient was prescribed emphasized low carbohydrate intake (less than 50 grams per day) and 60-80 grams (g) of protein per day. At today's session, patient was educated on the post-op diet and vitamin/mineral protocols. Patient understands the importance of keeping total fat and sugar intake to less than 3g per serving. Patient is aware of the the timeline for the different stages of the post-op diet and is aware that they will be on a modified full liquid diet for 2 weeks post-op. Patient understands that the body needs ~60-70 g/d protein. During the liquid diet phase, patient will need a minimum of 60 g/d protein from supplemental shakes.   Once eating soft protein-rich foods, patient will need 40-60 g/d protein from supplemental shakes to meet the total daily intake goal of  g/d protein. Patient understands the importance of being compliant with the diet and vitamin/mineral protocols, as well as the complications and risks that can occur if they are non-compliant. Patient has also been educated on the pre-op clear liquid diet protocol for 1 day prior to surgery. Patient understands that failure to comply with following the pre-op clear liquid diet could result in surgery being canceled. Patient's virtual appointment for 2 week post-op nutrition education has been scheduled. At this 2 week post-op visit, RD will assess how patient is tolerating liquids. If patient is tolerating liquid diet without difficulty, RD will recommend advancement of patient's diet to soft/moist (puree) diet to provider. Patient will also see RD again at 1-month post-op to assess tolerance of soft/moist diet and advance to soft protein with soft vegetables, if soft/moist diet is tolerated without difficulty. RD will assess patient's compliance with current protocol, including diet, vitamins/minerals, protein shakes, and physical activity. Post-op diet guidelines will be reinforced. Patient provided with RD contact information, and RD is available for questions and to meet with patient outside of the 2 week and 1 month post-op visit.      Candidate for surgery: Yes     Adelaida Lesches, RD

## 2023-01-31 ENCOUNTER — HOSPITAL ENCOUNTER (OUTPATIENT)
Dept: PREADMISSION TESTING | Age: 48
Discharge: HOME | End: 2023-01-31

## 2023-01-31 VITALS — HEIGHT: 63 IN | WEIGHT: 239 LBS | BODY MASS INDEX: 42.35 KG/M2

## 2023-01-31 RX ORDER — ACETAMINOPHEN 500 MG
2 TABLET ORAL DAILY
COMMUNITY

## 2023-01-31 RX ORDER — FAMOTIDINE 20 MG/50ML
20 INJECTION, SOLUTION INTRAVENOUS ONCE
Status: CANCELLED | OUTPATIENT
Start: 2023-01-31 | End: 2023-01-31

## 2023-01-31 RX ORDER — NYSTATIN 100000 [USP'U]/ML
500000 SUSPENSION ORAL
Status: CANCELLED | OUTPATIENT
Start: 2023-01-31 | End: 2023-01-31

## 2023-01-31 RX ORDER — ENOXAPARIN SODIUM 100 MG/ML
40 INJECTION SUBCUTANEOUS ONCE
Status: CANCELLED | OUTPATIENT
Start: 2023-01-31 | End: 2023-01-31

## 2023-01-31 RX ORDER — CIPROFLOXACIN 2 MG/ML
400 INJECTION, SOLUTION INTRAVENOUS ONCE
Status: CANCELLED | OUTPATIENT
Start: 2023-01-31 | End: 2023-01-31

## 2023-01-31 RX ORDER — SODIUM CHLORIDE, SODIUM LACTATE, POTASSIUM CHLORIDE, CALCIUM CHLORIDE 600; 310; 30; 20 MG/100ML; MG/100ML; MG/100ML; MG/100ML
125 INJECTION, SOLUTION INTRAVENOUS CONTINUOUS
Status: CANCELLED | OUTPATIENT
Start: 2023-01-31

## 2023-01-31 RX ORDER — ACETAMINOPHEN 500 MG
1000 TABLET ORAL ONCE
Status: CANCELLED | OUTPATIENT
Start: 2023-01-31 | End: 2023-01-31

## 2023-01-31 NOTE — PERIOP NOTES
No removable prosthetic devices or family history of malignant hyperthermia. Has sleep apnea-mild, no cpap use. CHG wash x 3 days instructions reviewed. PCP is aware of the surgery. No participation in clinical trial or research study. Do not bring any valuables on DOS- jewelry, wallet, cash, laptop, medications. Does not meet criteria for special population at this time. Possible time delay day of surgery reviewed. Covid status:Vaccinated, to bring card dos. DNR status-none. Noted order for pre-op antibiotics of [facility]. Patient reported allergy to Chephalosporins with reaction of hives. Left message for Benjamin Mendez at Dr. Getachew Anna or verification of pre-op antibiotics order.

## 2023-02-08 ENCOUNTER — ANESTHESIA EVENT (OUTPATIENT)
Dept: SURGERY | Age: 48
End: 2023-02-08
Payer: COMMERCIAL

## 2023-02-09 ENCOUNTER — ANESTHESIA (OUTPATIENT)
Dept: SURGERY | Age: 48
End: 2023-02-09
Payer: COMMERCIAL

## 2023-02-09 ENCOUNTER — HOSPITAL ENCOUNTER (INPATIENT)
Age: 48
LOS: 1 days | Discharge: HOME | End: 2023-02-10
Attending: SPECIALIST | Admitting: SPECIALIST
Payer: COMMERCIAL

## 2023-02-09 DIAGNOSIS — K21.9 GASTROESOPHAGEAL REFLUX DISEASE, UNSPECIFIED WHETHER ESOPHAGITIS PRESENT: ICD-10-CM

## 2023-02-09 DIAGNOSIS — E66.01 MORBID OBESITY WITH BMI OF 40.0-44.9, ADULT (HCC): Primary | ICD-10-CM

## 2023-02-09 DIAGNOSIS — K76.0 NAFLD (NONALCOHOLIC FATTY LIVER DISEASE): ICD-10-CM

## 2023-02-09 LAB
ABO + RH BLD: NORMAL
BLOOD GROUP ANTIBODIES SERPL: NORMAL
SPECIMEN EXP DATE BLD: NORMAL

## 2023-02-09 PROCEDURE — 77030002912 HC SUT ETHBND J&J -A: Performed by: SPECIALIST

## 2023-02-09 PROCEDURE — 77030002986 HC SUT PROL J&J -A: Performed by: SPECIALIST

## 2023-02-09 PROCEDURE — 77030040361 HC SLV COMPR DVT MDII -B: Performed by: SPECIALIST

## 2023-02-09 PROCEDURE — 65270000029 HC RM PRIVATE

## 2023-02-09 PROCEDURE — 0DJ08ZZ INSPECTION OF UPPER INTESTINAL TRACT, VIA NATURAL OR ARTIFICIAL OPENING ENDOSCOPIC: ICD-10-PCS | Performed by: SPECIALIST

## 2023-02-09 PROCEDURE — 77030002966 HC SUT PDS J&J -A: Performed by: SPECIALIST

## 2023-02-09 PROCEDURE — 77030041523 HC SEALNT FIBRN VITASEAL J&J -E: Performed by: SPECIALIST

## 2023-02-09 PROCEDURE — 77030020782 HC GWN BAIR PAWS FLX 3M -B: Performed by: SPECIALIST

## 2023-02-09 PROCEDURE — 77030008602 HC TRCR ENDOSC EPATH J&J -B: Performed by: SPECIALIST

## 2023-02-09 PROCEDURE — 77030008603 HC TRCR ENDOSC EPATH J&J -C: Performed by: SPECIALIST

## 2023-02-09 PROCEDURE — 43644 LAP GASTRIC BYPASS/ROUX-EN-Y: CPT | Performed by: SPECIALIST

## 2023-02-09 PROCEDURE — 88307 TISSUE EXAM BY PATHOLOGIST: CPT

## 2023-02-09 PROCEDURE — 77030009967 HC RELD STPLR ENDOSC J&J -C: Performed by: SPECIALIST

## 2023-02-09 PROCEDURE — 77030009968 HC RELD STPLR ENDOSC J&J -D: Performed by: SPECIALIST

## 2023-02-09 PROCEDURE — 76210000063 HC OR PH I REC FIRST 0.5 HR: Performed by: SPECIALIST

## 2023-02-09 PROCEDURE — 77030012893: Performed by: SPECIALIST

## 2023-02-09 PROCEDURE — 77030014008 HC SPNG HEMSTAT J&J -C: Performed by: SPECIALIST

## 2023-02-09 PROCEDURE — 36415 COLL VENOUS BLD VENIPUNCTURE: CPT

## 2023-02-09 PROCEDURE — 77030034154 HC SHR COAG HARM ACE J&J -F: Performed by: SPECIALIST

## 2023-02-09 PROCEDURE — 74011250637 HC RX REV CODE- 250/637: Performed by: SPECIALIST

## 2023-02-09 PROCEDURE — 77030002901 HC SUT DEV MCLOSE MPSA - B: Performed by: SPECIALIST

## 2023-02-09 PROCEDURE — 77030010030: Performed by: SPECIALIST

## 2023-02-09 PROCEDURE — 77030008565 HC TBNG SUC IRR ERBE -B: Performed by: SPECIALIST

## 2023-02-09 PROCEDURE — 77030020407 HC IV BLD WRMR ST 3M -A: Performed by: ANESTHESIOLOGY

## 2023-02-09 PROCEDURE — 74011000250 HC RX REV CODE- 250: Performed by: SPECIALIST

## 2023-02-09 PROCEDURE — 77030040506 HC DRN WND MDII -A: Performed by: SPECIALIST

## 2023-02-09 PROCEDURE — 86900 BLOOD TYPING SEROLOGIC ABO: CPT

## 2023-02-09 PROCEDURE — 74011250636 HC RX REV CODE- 250/636: Performed by: SPECIALIST

## 2023-02-09 PROCEDURE — 77030009851 HC PCH RTVR ENDOSC AMR -B: Performed by: SPECIALIST

## 2023-02-09 PROCEDURE — 76060000035 HC ANESTHESIA 2 TO 2.5 HR: Performed by: SPECIALIST

## 2023-02-09 PROCEDURE — 77030002916 HC SUT ETHLN J&J -A: Performed by: SPECIALIST

## 2023-02-09 PROCEDURE — 74011250636 HC RX REV CODE- 250/636

## 2023-02-09 PROCEDURE — 77030002896 HC SUT CLP ABSRB J&J -B: Performed by: SPECIALIST

## 2023-02-09 PROCEDURE — 74011000250 HC RX REV CODE- 250

## 2023-02-09 PROCEDURE — 77030002933 HC SUT MCRYL J&J -A: Performed by: SPECIALIST

## 2023-02-09 PROCEDURE — 77030008518 HC TBNG INSUF ENDO STRY -B: Performed by: SPECIALIST

## 2023-02-09 PROCEDURE — 77030041460 HC APL VISTASEAL J&J -B: Performed by: SPECIALIST

## 2023-02-09 PROCEDURE — 88313 SPECIAL STAINS GROUP 2: CPT

## 2023-02-09 PROCEDURE — 76010000131 HC OR TIME 2 TO 2.5 HR: Performed by: SPECIALIST

## 2023-02-09 PROCEDURE — 77030011810 HC STPLR ENDOSC J&J -G: Performed by: SPECIALIST

## 2023-02-09 PROCEDURE — 47379 UNLISTED LAPS PX LIVER: CPT | Performed by: SPECIALIST

## 2023-02-09 PROCEDURE — 77030008683 HC TU ET CUF COVD -A: Performed by: ANESTHESIOLOGY

## 2023-02-09 PROCEDURE — 77030009426 HC FCPS BIOP ENDOSC BSC -B: Performed by: SPECIALIST

## 2023-02-09 PROCEDURE — 0FB04ZX EXCISION OF LIVER, PERCUTANEOUS ENDOSCOPIC APPROACH, DIAGNOSTIC: ICD-10-PCS | Performed by: SPECIALIST

## 2023-02-09 PROCEDURE — 77030010515 HC APPL ENDOCLP LIG J&J -B: Performed by: SPECIALIST

## 2023-02-09 PROCEDURE — 2709999900 HC NON-CHARGEABLE SUPPLY: Performed by: SPECIALIST

## 2023-02-09 PROCEDURE — 77030003580 HC NDL INSUF VERES J&J -B: Performed by: SPECIALIST

## 2023-02-09 PROCEDURE — 77030006643: Performed by: ANESTHESIOLOGY

## 2023-02-09 PROCEDURE — 77030005513 HC CATH URETH FOL11 MDII -B: Performed by: SPECIALIST

## 2023-02-09 PROCEDURE — 77030008574 HC TBNG SUC IRR STRY -B: Performed by: SPECIALIST

## 2023-02-09 PROCEDURE — 0D164ZA BYPASS STOMACH TO JEJUNUM, PERCUTANEOUS ENDOSCOPIC APPROACH: ICD-10-PCS | Performed by: SPECIALIST

## 2023-02-09 PROCEDURE — 77030008477 HC STYL SATN SLP COVD -A: Performed by: ANESTHESIOLOGY

## 2023-02-09 PROCEDURE — 77030036732 HC RELD STPLR VASC J&J -F: Performed by: SPECIALIST

## 2023-02-09 PROCEDURE — 74011250636 HC RX REV CODE- 250/636: Performed by: ANESTHESIOLOGY

## 2023-02-09 PROCEDURE — 77030027138 HC INCENT SPIROMETER -A: Performed by: SPECIALIST

## 2023-02-09 PROCEDURE — 77030016151 HC PROTCTR LNS DFOG COVD -B: Performed by: SPECIALIST

## 2023-02-09 RX ORDER — ENOXAPARIN SODIUM 100 MG/ML
40 INJECTION SUBCUTANEOUS ONCE
Status: COMPLETED | OUTPATIENT
Start: 2023-02-09 | End: 2023-02-09

## 2023-02-09 RX ORDER — BUPIVACAINE HYDROCHLORIDE AND EPINEPHRINE 2.5; 5 MG/ML; UG/ML
INJECTION, SOLUTION EPIDURAL; INFILTRATION; INTRACAUDAL; PERINEURAL AS NEEDED
Status: DISCONTINUED | OUTPATIENT
Start: 2023-02-09 | End: 2023-02-09 | Stop reason: HOSPADM

## 2023-02-09 RX ORDER — EPHEDRINE SULFATE/0.9% NACL/PF 50 MG/5 ML
SYRINGE (ML) INTRAVENOUS AS NEEDED
Status: DISCONTINUED | OUTPATIENT
Start: 2023-02-09 | End: 2023-02-09 | Stop reason: HOSPADM

## 2023-02-09 RX ORDER — ACETAMINOPHEN 500 MG
1000 TABLET ORAL ONCE
Status: COMPLETED | OUTPATIENT
Start: 2023-02-09 | End: 2023-02-09

## 2023-02-09 RX ORDER — CIPROFLOXACIN 2 MG/ML
400 INJECTION, SOLUTION INTRAVENOUS ONCE
Status: COMPLETED | OUTPATIENT
Start: 2023-02-09 | End: 2023-02-09

## 2023-02-09 RX ORDER — DIPHENHYDRAMINE HYDROCHLORIDE 50 MG/ML
25 INJECTION, SOLUTION INTRAMUSCULAR; INTRAVENOUS
Status: DISCONTINUED | OUTPATIENT
Start: 2023-02-09 | End: 2023-02-10 | Stop reason: HOSPADM

## 2023-02-09 RX ORDER — LIDOCAINE HYDROCHLORIDE 20 MG/ML
INJECTION, SOLUTION EPIDURAL; INFILTRATION; INTRACAUDAL; PERINEURAL AS NEEDED
Status: DISCONTINUED | OUTPATIENT
Start: 2023-02-09 | End: 2023-02-09 | Stop reason: HOSPADM

## 2023-02-09 RX ORDER — SODIUM CHLORIDE, SODIUM LACTATE, POTASSIUM CHLORIDE, CALCIUM CHLORIDE 600; 310; 30; 20 MG/100ML; MG/100ML; MG/100ML; MG/100ML
125 INJECTION, SOLUTION INTRAVENOUS CONTINUOUS
Status: DISCONTINUED | OUTPATIENT
Start: 2023-02-09 | End: 2023-02-09 | Stop reason: HOSPADM

## 2023-02-09 RX ORDER — FAMOTIDINE 20 MG/50ML
20 INJECTION, SOLUTION INTRAVENOUS ONCE
Status: DISCONTINUED | OUTPATIENT
Start: 2023-02-09 | End: 2023-02-09

## 2023-02-09 RX ORDER — SODIUM CHLORIDE 0.9 % (FLUSH) 0.9 %
5-40 SYRINGE (ML) INJECTION EVERY 8 HOURS
Status: DISCONTINUED | OUTPATIENT
Start: 2023-02-09 | End: 2023-02-09 | Stop reason: HOSPADM

## 2023-02-09 RX ORDER — SODIUM CHLORIDE, SODIUM LACTATE, POTASSIUM CHLORIDE, CALCIUM CHLORIDE 600; 310; 30; 20 MG/100ML; MG/100ML; MG/100ML; MG/100ML
125 INJECTION, SOLUTION INTRAVENOUS CONTINUOUS
Status: DISCONTINUED | OUTPATIENT
Start: 2023-02-09 | End: 2023-02-09

## 2023-02-09 RX ORDER — ONDANSETRON 2 MG/ML
4 INJECTION INTRAMUSCULAR; INTRAVENOUS
Status: DISCONTINUED | OUTPATIENT
Start: 2023-02-09 | End: 2023-02-10 | Stop reason: HOSPADM

## 2023-02-09 RX ORDER — NYSTATIN 100000 [USP'U]/ML
500000 SUSPENSION ORAL
Status: COMPLETED | OUTPATIENT
Start: 2023-02-09 | End: 2023-02-09

## 2023-02-09 RX ORDER — ONDANSETRON 2 MG/ML
INJECTION INTRAMUSCULAR; INTRAVENOUS AS NEEDED
Status: DISCONTINUED | OUTPATIENT
Start: 2023-02-09 | End: 2023-02-09 | Stop reason: HOSPADM

## 2023-02-09 RX ORDER — ENOXAPARIN SODIUM 100 MG/ML
40 INJECTION SUBCUTANEOUS EVERY 12 HOURS
Status: DISCONTINUED | OUTPATIENT
Start: 2023-02-09 | End: 2023-02-10 | Stop reason: HOSPADM

## 2023-02-09 RX ORDER — SUCCINYLCHOLINE CHLORIDE 100 MG/5ML
SYRINGE (ML) INTRAVENOUS AS NEEDED
Status: DISCONTINUED | OUTPATIENT
Start: 2023-02-09 | End: 2023-02-09 | Stop reason: HOSPADM

## 2023-02-09 RX ORDER — HYDROMORPHONE HYDROCHLORIDE 1 MG/ML
0.5 INJECTION, SOLUTION INTRAMUSCULAR; INTRAVENOUS; SUBCUTANEOUS
Status: DISCONTINUED | OUTPATIENT
Start: 2023-02-09 | End: 2023-02-09 | Stop reason: HOSPADM

## 2023-02-09 RX ORDER — FENTANYL CITRATE 50 UG/ML
25 INJECTION, SOLUTION INTRAMUSCULAR; INTRAVENOUS AS NEEDED
Status: DISCONTINUED | OUTPATIENT
Start: 2023-02-09 | End: 2023-02-09 | Stop reason: HOSPADM

## 2023-02-09 RX ORDER — PROPOFOL 10 MG/ML
INJECTION, EMULSION INTRAVENOUS AS NEEDED
Status: DISCONTINUED | OUTPATIENT
Start: 2023-02-09 | End: 2023-02-09 | Stop reason: HOSPADM

## 2023-02-09 RX ORDER — SODIUM CHLORIDE 0.9 % (FLUSH) 0.9 %
5-40 SYRINGE (ML) INJECTION AS NEEDED
Status: DISCONTINUED | OUTPATIENT
Start: 2023-02-09 | End: 2023-02-09 | Stop reason: HOSPADM

## 2023-02-09 RX ORDER — MIDAZOLAM HYDROCHLORIDE 1 MG/ML
INJECTION, SOLUTION INTRAMUSCULAR; INTRAVENOUS AS NEEDED
Status: DISCONTINUED | OUTPATIENT
Start: 2023-02-09 | End: 2023-02-09 | Stop reason: HOSPADM

## 2023-02-09 RX ORDER — CIPROFLOXACIN 2 MG/ML
400 INJECTION, SOLUTION INTRAVENOUS EVERY 12 HOURS
Status: COMPLETED | OUTPATIENT
Start: 2023-02-09 | End: 2023-02-09

## 2023-02-09 RX ORDER — KETOROLAC TROMETHAMINE 30 MG/ML
15 INJECTION, SOLUTION INTRAMUSCULAR; INTRAVENOUS EVERY 6 HOURS
Status: DISCONTINUED | OUTPATIENT
Start: 2023-02-09 | End: 2023-02-10

## 2023-02-09 RX ORDER — DIPHENHYDRAMINE HYDROCHLORIDE 50 MG/ML
INJECTION, SOLUTION INTRAMUSCULAR; INTRAVENOUS AS NEEDED
Status: DISCONTINUED | OUTPATIENT
Start: 2023-02-09 | End: 2023-02-09 | Stop reason: HOSPADM

## 2023-02-09 RX ORDER — SODIUM CHLORIDE, SODIUM LACTATE, POTASSIUM CHLORIDE, CALCIUM CHLORIDE 600; 310; 30; 20 MG/100ML; MG/100ML; MG/100ML; MG/100ML
150 INJECTION, SOLUTION INTRAVENOUS CONTINUOUS
Status: DISCONTINUED | OUTPATIENT
Start: 2023-02-09 | End: 2023-02-10 | Stop reason: HOSPADM

## 2023-02-09 RX ORDER — HYDROMORPHONE HYDROCHLORIDE 2 MG/ML
INJECTION, SOLUTION INTRAMUSCULAR; INTRAVENOUS; SUBCUTANEOUS AS NEEDED
Status: DISCONTINUED | OUTPATIENT
Start: 2023-02-09 | End: 2023-02-09 | Stop reason: HOSPADM

## 2023-02-09 RX ORDER — NALOXONE HYDROCHLORIDE 0.4 MG/ML
0.4 INJECTION, SOLUTION INTRAMUSCULAR; INTRAVENOUS; SUBCUTANEOUS AS NEEDED
Status: DISCONTINUED | OUTPATIENT
Start: 2023-02-09 | End: 2023-02-10 | Stop reason: HOSPADM

## 2023-02-09 RX ORDER — MORPHINE SULFATE 10 MG/ML
6 INJECTION, SOLUTION INTRAMUSCULAR; INTRAVENOUS
Status: DISCONTINUED | OUTPATIENT
Start: 2023-02-09 | End: 2023-02-10

## 2023-02-09 RX ORDER — ROCURONIUM BROMIDE 10 MG/ML
INJECTION, SOLUTION INTRAVENOUS AS NEEDED
Status: DISCONTINUED | OUTPATIENT
Start: 2023-02-09 | End: 2023-02-09 | Stop reason: HOSPADM

## 2023-02-09 RX ADMIN — HYDROMORPHONE HYDROCHLORIDE 0.5 MG: 2 INJECTION, SOLUTION INTRAMUSCULAR; INTRAVENOUS; SUBCUTANEOUS at 07:27

## 2023-02-09 RX ADMIN — KETOROLAC TROMETHAMINE 15 MG: 30 INJECTION, SOLUTION INTRAMUSCULAR; INTRAVENOUS at 18:26

## 2023-02-09 RX ADMIN — MIDAZOLAM 2 MG: 1 INJECTION INTRAMUSCULAR; INTRAVENOUS at 07:27

## 2023-02-09 RX ADMIN — HYDROMORPHONE HYDROCHLORIDE 0.2 MG: 2 INJECTION, SOLUTION INTRAMUSCULAR; INTRAVENOUS; SUBCUTANEOUS at 08:45

## 2023-02-09 RX ADMIN — FENTANYL CITRATE 25 MCG: 50 INJECTION, SOLUTION INTRAMUSCULAR; INTRAVENOUS at 09:55

## 2023-02-09 RX ADMIN — PROPOFOL 200 MG: 10 INJECTION, EMULSION INTRAVENOUS at 07:34

## 2023-02-09 RX ADMIN — FENTANYL CITRATE 25 MCG: 50 INJECTION, SOLUTION INTRAMUSCULAR; INTRAVENOUS at 10:07

## 2023-02-09 RX ADMIN — ONDANSETRON HYDROCHLORIDE 4 MG: 2 INJECTION INTRAMUSCULAR; INTRAVENOUS at 07:41

## 2023-02-09 RX ADMIN — Medication 10 MG: at 08:11

## 2023-02-09 RX ADMIN — CIPROFLOXACIN 400 MG: 2 INJECTION, SOLUTION INTRAVENOUS at 18:29

## 2023-02-09 RX ADMIN — FAMOTIDINE 20 MG: 10 INJECTION, SOLUTION INTRAVENOUS at 06:30

## 2023-02-09 RX ADMIN — Medication 5 MG: at 07:55

## 2023-02-09 RX ADMIN — SODIUM CHLORIDE, SODIUM LACTATE, POTASSIUM CHLORIDE, AND CALCIUM CHLORIDE: 600; 310; 30; 20 INJECTION, SOLUTION INTRAVENOUS at 08:41

## 2023-02-09 RX ADMIN — SODIUM CHLORIDE, SODIUM LACTATE, POTASSIUM CHLORIDE, AND CALCIUM CHLORIDE 125 ML/HR: 600; 310; 30; 20 INJECTION, SOLUTION INTRAVENOUS at 06:30

## 2023-02-09 RX ADMIN — SODIUM CHLORIDE, POTASSIUM CHLORIDE, SODIUM LACTATE AND CALCIUM CHLORIDE 150 ML/HR: 600; 310; 30; 20 INJECTION, SOLUTION INTRAVENOUS at 10:55

## 2023-02-09 RX ADMIN — ACETAMINOPHEN 1000 MG: 500 TABLET ORAL at 06:30

## 2023-02-09 RX ADMIN — KETOROLAC TROMETHAMINE 15 MG: 30 INJECTION, SOLUTION INTRAMUSCULAR; INTRAVENOUS at 11:10

## 2023-02-09 RX ADMIN — FENTANYL CITRATE 25 MCG: 50 INJECTION, SOLUTION INTRAMUSCULAR; INTRAVENOUS at 10:02

## 2023-02-09 RX ADMIN — ROCURONIUM BROMIDE 40 MG: 10 INJECTION, SOLUTION INTRAVENOUS at 07:48

## 2023-02-09 RX ADMIN — NYSTATIN 500000 UNITS: 100000 SUSPENSION ORAL at 06:33

## 2023-02-09 RX ADMIN — ENOXAPARIN SODIUM 40 MG: 100 INJECTION SUBCUTANEOUS at 06:31

## 2023-02-09 RX ADMIN — DIPHENHYDRAMINE HYDROCHLORIDE 12.5 MG: 50 INJECTION INTRAMUSCULAR; INTRAVENOUS at 07:41

## 2023-02-09 RX ADMIN — ROCURONIUM BROMIDE 10 MG: 10 INJECTION, SOLUTION INTRAVENOUS at 07:34

## 2023-02-09 RX ADMIN — CIPROFLOXACIN 400 MG: 2 INJECTION, SOLUTION INTRAVENOUS at 07:41

## 2023-02-09 RX ADMIN — SODIUM CHLORIDE, POTASSIUM CHLORIDE, SODIUM LACTATE AND CALCIUM CHLORIDE 150 ML/HR: 600; 310; 30; 20 INJECTION, SOLUTION INTRAVENOUS at 18:29

## 2023-02-09 RX ADMIN — ENOXAPARIN SODIUM 40 MG: 100 INJECTION SUBCUTANEOUS at 18:29

## 2023-02-09 RX ADMIN — MORPHINE SULFATE 6 MG: 10 INJECTION INTRAVENOUS at 18:24

## 2023-02-09 RX ADMIN — LIDOCAINE HYDROCHLORIDE 100 MG: 20 INJECTION, SOLUTION INTRAVENOUS at 07:34

## 2023-02-09 RX ADMIN — ROCURONIUM BROMIDE 10 MG: 10 INJECTION, SOLUTION INTRAVENOUS at 09:27

## 2023-02-09 RX ADMIN — Medication 200 MG: at 07:34

## 2023-02-09 RX ADMIN — MORPHINE SULFATE 6 MG: 10 INJECTION INTRAVENOUS at 22:03

## 2023-02-09 RX ADMIN — ROCURONIUM BROMIDE 10 MG: 10 INJECTION, SOLUTION INTRAVENOUS at 08:55

## 2023-02-09 RX ADMIN — SODIUM CHLORIDE, SODIUM LACTATE, POTASSIUM CHLORIDE, AND CALCIUM CHLORIDE: 600; 310; 30; 20 INJECTION, SOLUTION INTRAVENOUS at 07:58

## 2023-02-09 RX ADMIN — SUGAMMADEX 500 MG: 100 INJECTION, SOLUTION INTRAVENOUS at 09:30

## 2023-02-09 RX ADMIN — HYDROMORPHONE HYDROCHLORIDE 0.5 MG: 1 INJECTION, SOLUTION INTRAMUSCULAR; INTRAVENOUS; SUBCUTANEOUS at 10:04

## 2023-02-09 RX ADMIN — HYDROMORPHONE HYDROCHLORIDE 0.3 MG: 2 INJECTION, SOLUTION INTRAMUSCULAR; INTRAVENOUS; SUBCUTANEOUS at 09:22

## 2023-02-09 RX ADMIN — ROCURONIUM BROMIDE 10 MG: 10 INJECTION, SOLUTION INTRAVENOUS at 08:44

## 2023-02-09 NOTE — ANESTHESIA POSTPROCEDURE EVALUATION
Post-Anesthesia Evaluation and Assessment    Cardiovascular Function/Vital Signs  Visit Vitals  /74   Pulse 76   Temp 36.2 °C (97.2 °F)   Resp 15   Ht 5' 3\" (1.6 m)   Wt 108 kg (238 lb 1.6 oz)   SpO2 99%   BMI 42.18 kg/m²       Patient is status post Procedure(s):  LAPAROSCOPIC GASTRIC BYPASS WITH  LIVER WEDGE BIOPSY AND INTRAOPERATIVE ENDOSCOPY. Nausea/Vomiting: Controlled. Postoperative hydration reviewed and adequate. Pain:  Pain Scale 1: Numeric (0 - 10) (02/09/23 1005)  Pain Intensity 1: 4 (02/09/23 1005)   Managed. Neurological Status:   Neuro (WDL): Exceptions to WDL (02/09/23 1005)   At baseline. Mental Status and Level of Consciousness: Baseline and stable. Pulmonary Status:   O2 Device: Nasal cannula (02/09/23 1005)   Adequate oxygenation and airway patent. Complications related to anesthesia: None    Post-anesthesia assessment completed. No concerns. Patient has met all discharge requirements.     Signed By: Cornelius Rodriguez MD

## 2023-02-09 NOTE — INTERVAL H&P NOTE
Update History & Physical    The Patient's History and Physical of January 30, 2023 was reviewed with the patient and I examined the patient. There was no change. The surgical site was confirmed by the patient and me. Plan:  The risk, benefits, expected outcome, and alternative to the recommended procedure have been discussed with the patient. Patient understands and wants to proceed with the procedure.     Electronically signed by Ciarra Byrd MD on 2/9/2023 at 7:22 AM

## 2023-02-09 NOTE — PERIOP NOTES
Reviewed PTA medication list with patient/caregiver and patient/caregiver denies any additional medications. Patient admits to having a responsible adult care for them at home for at least 24 hours after surgery. Patient encouraged to use gown warming system and informed that using said warming gown to regulate body temperature prior to a procedure has been shown to help reduce the risks of blood clots and infection. Patient's pharmacy of choice verified and documented in PTA medication section. Dual skin assessment & fall risk band verification completed with [facility].

## 2023-02-09 NOTE — PROGRESS NOTES
Problem: Falls - Risk of  Goal: *Absence of Falls  Description: Document Mina Rashid Fall Risk and appropriate interventions in the flowsheet.   Outcome: Progressing Towards Goal  Note: Fall Risk Interventions:                                Problem: Patient Education: Go to Patient Education Activity  Goal: Patient/Family Education  Outcome: Progressing Towards Goal

## 2023-02-09 NOTE — OP NOTES
OPERATIVE REPORT                           Patient:Melissa Lewis                 : 1975                Medical Record [id number]                  Pre-operative Diagnosis:  HYPERTENSION, SLEEP APNEA, MORBID OBESITY, BMI 45                Post-operative Diagnosis: HYPERTENSION, SLEEP APNEA, MORBID OBESITY, BMI 45                Procedure: Procedure(s):  LAPAROSCOPIC GASTRIC BYPASS (antecolic / antegastric)  LIVER WEDGE BIOPSY  INTRAOPERATIVE ENDOSCOPY                Location: [facility]                Surgeon: Paola Smith MD                Assistant:  Leonor Vegas [facility] (there are no qualified interns, residents, or any other qualified house physicians available to assist with this surgery) - performed retraction of various structures, facilitated creating small bowel anastomsis, placed circular stapling cap through the stomach wall, assisted in creating the gastric pouch, fired various stapling devices, obtained hemostasis along staple lines via hemoclips, applied Eviseal,  retrieved all specimens from the abdominal cavity, closed fascial defect, and sutured incisions                    Anesthesia: General                 Specimen:  Liver Wedge  Biopsy                EBL: less than 10 cc                Complications: none                      STATEMENT OF MEDICAL NECESSITY: The patient is a 56y.o.-year-old female who has had a long-standing history of obesity. She has developed health problems related to  obesity and has significant cardiac disease and came to me in consultation  for the gastric bypass procedure. she has undergone preoperative evaluation and was felt to be a reasonable candidate for surgery. I explained to the patient the risks associated with this procedure and she understood all this very clearly and did wish to proceed with operative intervention at this time period.                     OPERATIVE PROCEDURE: The patient was brought to the operating room, placed on the table in the supine position at which time period general anesthesia was administered without any difficulty. The abdomen was then prepped and draped in  The usual sterile fashion. Using a 15 blade, a 1 cm incision was made just to the left of the midline at the level of the umbilicus. A Veres needle approach was used to gain access to the peritoneal cavity which was then insufflated. The Visiport was then placed at that site insufflating the abdomen, then 4 additional trocars were placed in the usual U-shaped configuration with a subxiphoid incision being made to accommodate the [facility] retractor. On entering the abdomen, the patient was noted to have a moderate fatty liver with some evidence of nodularity throughout possibly consistent with early steatohepatitis. I began the operation by choosing an area approximately 50 cm distal to the ligament of Treitz. I transected the small bowel using the Endo JOVANNY stapler with white reloads, I then divided the mesentery of the small bowel using 3 firings of the Endo JOVANNY stapler, which allowed for adequate mobilization to the upper abdominal region. I then measured off a 150-cm Terrie limb bypass and placed  it in a side-to-side fashion with the afferent limb placing stay sutures in the 2 limbs of the bowel. I then created enterotomies in the 2 limbs of the bowel and placed the Endo JOVANNY stapler intra luminally closing it and firing  it creating the linear anastomosis. With this anastomosis being hemostatic, the free margin of the enterotomy was then re approximated using 2-0 Ethibond suture and these sutures were then held up to facilitate closure using the stapling device. The mesenteric defect at this site was then closed  using a running 2-0 Ethibond suture. I then elected to bring the terrie limb anterior to the transverse colon and did so in the usual fashion. The terrie limb reached nicely to the upper abdominal region under no tension.  I then placed a Baker's tube transorally in the stomach and inflated the balloon to 20 mL of saline solution and then I pulled it back towards the gastroesophageal junction. I then at this juncture dissected along the angle of His, exposing the left crura and I likewise dissected along the lesser curvature of the stomach, entering the retro gastric space. Once this space was then developed, I then marked the planned anastomosis of the pouch using a single dot of dilute methylene blue. I then removed the Baker's tube at this juncture. An anterior gastrotomy was then fashioned in the antrum of the stomach, then the cap of a 21 ILS stapler was then placed transabdominally guiding it through the gastrotomy out through the anterior gastric pouch in the area marked using a flamingo grasper and a Prolene suture attached to the cap. After retrieving the cap, a purse string suture was then placed at the base and tied securely. I then created the pouch using the powered [place] stapler with blue reloads . I used one firing along the base of the planned pouch then 3 vertical firings completing this linear 20 mL pouch. With the pouch having been created, the anterior gastrotomy was then closed using the same stapling device. I then at this juncture brought the Terrie limb in a antecolic, antegastric fashion. It reached nicely to the level of the pouch under no tension at all. I then opened up the free end of the Terrie limb using the Harmonic scalpel. I guided the circular stapling device transabdominally through the left lower quadrant incision, guiding it through the enterotomy, then deployed the spear through the antimesenteric border of the bowel. It was then attached to the cap, closed and fired creating the circular anastomosis. With 2 very good tissue rings  noted within the stapling device, the free margin of the Terrie limb was then closed using the Endo JOVANNY stapler with white reloads. I then over sewed the anastomosis using 2-0 Ethibond suture.   I then left the operative field and proceeded to the the head of the bed and performed an intraoperative EGD. The scope was passed successfully into the gastric pouch to the level of the anastomosis. There was no bleeding noted and no leak appreciated with air insufflation. I then returned to the surgical field. At this juncture I then straightened out the Terrie limb which  passed anterior to the transverse colon. When this was all achieved, I then turned my attention to checking all areas for hemostasis using Eviseal and Sugicel SNOW to achieve this. I then removed the liver retractor and due to its abnormal appearance  I did perform a liver wedge biopsy it to assess for fibrosis and submitted it to pathology for permanent section. I then placed a J-P drain in the upper abdominal region. I removed all trocars and closed the left lower quadrant trocar site using a transabdominal 0 PDS suture along the fascia. All skin incisions were irrigated with polymyxin irrigation and  then closed using 4-0 sutures of Monocryl and Steri-Strips and sterile dressings were applied. The patient tolerated the procedure well.                  Norberto Mills M.D.

## 2023-02-09 NOTE — PROGRESS NOTES
TRANSFER - IN REPORT:    Verbal report received from [address] [place] [address] (name) on [facility]  being received from PACU(unit) for routine progression of care      Report consisted of patients Situation, Background, Assessment and   Recommendations(SBAR). Information from the following report(s) SBAR, Procedure Summary, Intake/Output, MAR, and Recent Results was reviewed with the receiving nurse. Opportunity for questions and clarification was provided. Assessment completed upon patients arrival to unit and care assumed.

## 2023-02-09 NOTE — PROGRESS NOTES
D/C plan; Home with family assistance. Family to transport. CM completed a chart review. Pt is independent at baseline. Pt admitted for an elective procedure. No CM d/c needs identified. CM will continue to follow. Transition of Care (JEREMY) Plan:          Pt admitted for an elective surgical procedure. LAPAROSCOPIC GASTRIC BYPASS (antecolic / antegastric)  LIVER WEDGE BIOPSY  INTRAOPERATIVE ENDOSCOPY  Pt is independent. Please encourage ambulation. No transition of care needs identified at this time. Anticipate pt will be medically stable for discharge within the next 24-48 hours with physician follow up. CM available to assist as needed. JEREMY Transportation:   How is patient being transported at discharge? Family      When? Once cleared by physician     Is transport scheduled? N/A      Follow-up appointment and transportation:   PCP/Specialist?  See AVS for Appointment         Who is transporting to the follow-up appointment? SFamily       Is transport for follow up appointment scheduled? N/A    Communication plan (with patient/family): Who is being called? Patient Responsible party? Patient      What number(s) is to be used? See Facesheet      What service provider is calling for [facility] services? N/a          When are they calling? N/a    Readmission Risk? (Green/Low; Yellow/Moderate; Red/High):  [employer] here to complete [address] including selection of the Healthcare Decision Maker Relationship (ie \"Primary\")    Care Management Interventions  Mode of Transport at Discharge:  Other (see comment) (Spouse )  Transition of Care Consult (CM Consult): Discharge Planning  Discharge Durable Medical Equipment: No  Physical Therapy Consult: No  Occupational Therapy Consult: No  Speech Therapy Consult: No  Support Systems: Spouse/Significant Other  Confirm Follow Up Transport: Family  The Plan for Transition of Care is Related to the Following Treatment Goals : home w/ family assistance.   Discharge Location  Patient Expects to be Discharged to[de-identified] Home with family assistance

## 2023-02-09 NOTE — PERIOP NOTES
TRANSFER - OUT REPORT:    Verbal report given to Олег(name) on Stefani Franz  being transferred to [address] [place] [address](unit) for routine progression of care       Report consisted of patients Situation, Background, Assessment and   Recommendations(SBAR). Information from the following report(s) SBAR was reviewed with the receiving nurse. Lines:   Peripheral IV 02/09/23 Left Forearm (Active)   Site Assessment Clean, dry, & intact 02/09/23 1005   Phlebitis Assessment 0 02/09/23 1005   Infiltration Assessment 0 02/09/23 1005   Dressing Status Clean, dry, & intact 02/09/23 1005   Dressing Type Transparent;Tape 02/09/23 1005   Hub Color/Line Status Green;Patent; Infusing 02/09/23 1005        Opportunity for questions and clarification was provided.       Patient transported with:   Registered Nurse  Tech

## 2023-02-09 NOTE — PERIOP NOTES
TRANSFER - OUT REPORT:    Verbal report given to Олег(name) on Son Candelaria  being transferred to [address] [place] [address](unit) for routine progression of care       Report consisted of patients Situation, Background, Assessment and   Recommendations(SBAR). Information from the following report(s) SBAR was reviewed with the receiving nurse. Lines:   Peripheral IV 02/09/23 Left Forearm (Active)   Site Assessment Clean, dry, & intact 02/09/23 1005   Phlebitis Assessment 0 02/09/23 1005   Infiltration Assessment 0 02/09/23 1005   Dressing Status Clean, dry, & intact 02/09/23 1005   Dressing Type Transparent;Tape 02/09/23 1005   Hub Color/Line Status Green;Patent; Infusing 02/09/23 1005        Opportunity for questions and clarification was provided.       Patient transported with:   Registered Nurse  Tech

## 2023-02-09 NOTE — OP NOTES
OPERATIVE REPORT                           Patient:Melissa Lewis                 : 1975                [id number]                  Pre-operative Diagnosis:  HYPERTENSION, SLEEP APNEA, MORBID OBESITY, BMI 45                Post-operative Diagnosis: HYPERTENSION, SLEEP APNEA, MORBID OBESITY, BMI 45                Procedure: Procedure(s):  LAPAROSCOPIC GASTRIC BYPASS (antecolic / antegastric)  LIVER WEDGE BIOPSY  INTRAOPERATIVE ENDOSCOPY                Location: [facility]                Surgeon: Mago Navarro MD                Assistant:  Antonietta Mahan [facility] (there are no qualified interns, residents, or any other qualified house physicians available to assist with this surgery) - performed retraction of various structures, facilitated creating small bowel anastomsis, placed circular stapling cap through the stomach wall, assisted in creating the gastric pouch, fired various stapling devices, obtained hemostasis along staple lines via hemoclips, applied Eviseal,  retrieved all specimens from the abdominal cavity, closed fascial defect, and sutured incisions                    Anesthesia: General                 Specimen:  Liver Wedge  Biopsy                EBL: less than 10 cc                Complications: none                      STATEMENT OF MEDICAL NECESSITY: The patient is a 56y.o.-year-old female who has had a long-standing history of obesity. She has developed health problems related to  obesity and has significant cardiac disease and came to me in consultation  for the gastric bypass procedure. she has undergone preoperative evaluation and was felt to be a reasonable candidate for surgery. I explained to the patient the risks associated with this procedure and she understood all this very clearly and did wish to proceed with operative intervention at this time period.                     OPERATIVE PROCEDURE: The patient was brought to the operating room, placed on the table in the supine position at which time period general anesthesia was administered without any difficulty. The abdomen was then prepped and draped in  The usual sterile fashion. Using a 15 blade, a 1 cm incision was made just to the left of the midline at the level of the umbilicus. A Veres needle approach was used to gain access to the peritoneal cavity which was then insufflated. The Visiport was then placed at that site insufflating the abdomen, then 4 additional trocars were placed in the usual U-shaped configuration with a subxiphoid incision being made to accommodate the [facility] retractor. On entering the abdomen, the patient was noted to have a moderate fatty liver with some evidence of nodularity throughout possibly consistent with early steatohepatitis. I began the operation by choosing an area approximately 50 cm distal to the ligament of Treitz. I transected the small bowel using the Endo JOVANNY stapler with white reloads, I then divided the mesentery of the small bowel using 3 firings of the Endo JOVANNY stapler, which allowed for adequate mobilization to the upper abdominal region. I then measured off a 150-cm Terrie limb bypass and placed  it in a side-to-side fashion with the afferent limb placing stay sutures in the 2 limbs of the bowel. I then created enterotomies in the 2 limbs of the bowel and placed the Endo JOVANNY stapler intra luminally closing it and firing  it creating the linear anastomosis. With this anastomosis being hemostatic, the free margin of the enterotomy was then re approximated using 2-0 Ethibond suture and these sutures were then held up to facilitate closure using the stapling device. The mesenteric defect at this site was then closed  using a running 2-0 Ethibond suture. I then elected to bring the terrie limb anterior to the transverse colon and did so in the usual fashion. The terrie limb reached nicely to the upper abdominal region under no tension.  I then placed a Baker's tube transorally in the stomach and inflated the balloon to 20 mL of saline solution and then I pulled it back towards the gastroesophageal junction. I then at this juncture dissected along the angle of His, exposing the left crura and I likewise dissected along the lesser curvature of the stomach, entering the retro gastric space. Once this space was then developed, I then marked the planned anastomosis of the pouch using a single dot of dilute methylene blue. I then removed the Baker's tube at this juncture. An anterior gastrotomy was then fashioned in the antrum of the stomach, then the cap of a 21 ILS stapler was then placed transabdominally guiding it through the gastrotomy out through the anterior gastric pouch in the area marked using a flamingo grasper and a Prolene suture attached to the cap. After retrieving the cap, a purse string suture was then placed at the base and tied securely. I then created the pouch using the powered [place] stapler with blue reloads . I used one firing along the base of the planned pouch then 3 vertical firings completing this linear 20 mL pouch. With the pouch having been created, the anterior gastrotomy was then closed using the same stapling device. I then at this juncture brought the Terrie limb in a antecolic, antegastric fashion. It reached nicely to the level of the pouch under no tension at all. I then opened up the free end of the Terrie limb using the Harmonic scalpel. I guided the circular stapling device transabdominally through the left lower quadrant incision, guiding it through the enterotomy, then deployed the spear through the antimesenteric border of the bowel. It was then attached to the cap, closed and fired creating the circular anastomosis. With 2 very good tissue rings  noted within the stapling device, the free margin of the Terrie limb was then closed using the Endo JOVANNY stapler with white reloads. I then over sewed the anastomosis using 2-0 Ethibond suture.   I then left the operative field and proceeded to the the head of the bed and performed an intraoperative EGD. The scope was passed successfully into the gastric pouch to the level of the anastomosis. There was no bleeding noted and no leak appreciated with air insufflation. I then returned to the surgical field. At this juncture I then straightened out the Terrie limb which  passed anterior to the transverse colon. When this was all achieved, I then turned my attention to checking all areas for hemostasis using Eviseal and Sugicel SNOW to achieve this. I then removed the liver retractor and due to its abnormal appearance  I did perform a liver wedge biopsy it to assess for fibrosis and submitted it to pathology for permanent section. I then placed a J-P drain in the upper abdominal region. I removed all trocars and closed the left lower quadrant trocar site using a transabdominal 0 PDS suture along the fascia. All skin incisions were irrigated with polymyxin irrigation and  then closed using 4-0 sutures of Monocryl and Steri-Strips and sterile dressings were applied. The patient tolerated the procedure well.                  Holly Strickland M.D.

## 2023-02-09 NOTE — PERIOP NOTES
Reviewed PTA medication list with patient/caregiver and patient/caregiver denies any additional medications. Patient admits to having a responsible adult care for them at home for at least 24 hours after surgery. Patient encouraged to use gown warming system and informed that using said warming gown to regulate body temperature prior to a procedure has been shown to help reduce the risks of blood clots and infection. Patient's pharmacy of choice verified and documented in PTA medication section. Dual skin assessment & fall risk band verification completed with Leopoldo Khan.

## 2023-02-09 NOTE — PERIOP NOTES
TRANSFER - IN REPORT:    Verbal report received from [facility](name) on Earvin Flick  being received from OR(unit) for routine progression of care      Report consisted of patients Situation, Background, Assessment and   Recommendations(SBAR). Information from the following report(s) OR Summary was reviewed with the receiving nurse. Opportunity for questions and clarification was provided. Assessment completed upon patients arrival to unit and care assumed.

## 2023-02-09 NOTE — PERIOP NOTES
TRANSFER - IN REPORT:    Verbal report received from [facility](name) on Sushila Hensley  being received from OR(unit) for routine progression of care      Report consisted of patients Situation, Background, Assessment and   Recommendations(SBAR). Information from the following report(s) OR Summary was reviewed with the receiving nurse. Opportunity for questions and clarification was provided. Assessment completed upon patients arrival to unit and care assumed.

## 2023-02-09 NOTE — ANESTHESIA PREPROCEDURE EVALUATION
Relevant Problems   RESPIRATORY SYSTEM   (+) Obstructive sleep apnea syndrome      CARDIOVASCULAR   (+) Hypertension      GASTROINTESTINAL   (+) GERD (gastroesophageal reflux disease)      RENAL FAILURE   (+) Kidney stones      ENDOCRINE   (+) Arthritis   (+) Morbid obesity ([address]       Anesthetic History     PONV          Review of Systems / Medical History  Patient summary reviewed, nursing notes reviewed and pertinent labs reviewed    Pulmonary        Sleep apnea           Neuro/Psych         Psychiatric history     Cardiovascular    Hypertension              Exercise tolerance: >4 METS     GI/Hepatic/Renal     GERD           Endo/Other        Morbid obesity and arthritis     Other Findings              Physical Exam    Airway  Mallampati: II  TM Distance: 4 - 6 cm  Neck ROM: decreased range of motion   Mouth opening: Normal     Cardiovascular  Regular rate and rhythm,  S1 and S2 normal,  no murmur, click, rub, or gallop  Rhythm: regular  Rate: normal         Dental  No notable dental hx       Pulmonary  Breath sounds clear to auscultation               Abdominal  GI exam deferred       Other Findings            Anesthetic Plan    ASA: 3  Anesthesia type: general            Anesthetic plan and risks discussed with: Patient

## 2023-02-09 NOTE — INTERVAL H&P NOTE
Update History & Physical    The Patient's History and Physical of January 30, 2023 was reviewed with the patient and I examined the patient. There was no change. The surgical site was confirmed by the patient and me. Plan:  The risk, benefits, expected outcome, and alternative to the recommended procedure have been discussed with the patient. Patient understands and wants to proceed with the procedure.     Electronically signed by Valencia Marrero MD on 2/9/2023 at 7:22 AM

## 2023-02-09 NOTE — PROGRESS NOTES
D/C plan; Home with family assistance. Family to transport. CM completed a chart review. Pt is independent at baseline. Pt admitted for an elective procedure. No CM d/c needs identified. CM will continue to follow. Transition of Care (JEREMY) Plan:          Pt admitted for an elective surgical procedure. LAPAROSCOPIC GASTRIC BYPASS (antecolic / antegastric)  LIVER WEDGE BIOPSY  INTRAOPERATIVE ENDOSCOPY  Pt is independent. Please encourage ambulation. No transition of care needs identified at this time. Anticipate pt will be medically stable for discharge within the next 24-48 hours with physician follow up. CM available to assist as needed. JEREMY Transportation:   How is patient being transported at discharge? Family      When? Once cleared by physician     Is transport scheduled? N/A      Follow-up appointment and transportation:   PCP/Specialist?  See AVS for Appointment         Who is transporting to the follow-up appointment? SFamily       Is transport for follow up appointment scheduled? N/A    Communication plan (with patient/family): Who is being called? Patient Responsible party? Patient      What number(s) is to be used? See Facesheet      What service provider is calling for [facility] services? N/a          When are they calling? N/a    Readmission Risk? (Green/Low; Yellow/Moderate; Red/High):  [employer] here to complete [address] including selection of the Healthcare Decision Maker Relationship (ie \"Primary\")    Care Management Interventions  Mode of Transport at Discharge:  Other (see comment) (Spouse )  Transition of Care Consult (CM Consult): Discharge Planning  Discharge Durable Medical Equipment: No  Physical Therapy Consult: No  Occupational Therapy Consult: No  Speech Therapy Consult: No  Support Systems: Spouse/Significant Other  Confirm Follow Up Transport: Family  The Plan for Transition of Care is Related to the Following Treatment Goals : home w/ family assistance.   Discharge Location  Patient Expects to be Discharged to[de-identified] Home with family assistance

## 2023-02-09 NOTE — PROGRESS NOTES
Problem: Falls - Risk of  Goal: *Absence of Falls  Description: Document Bertrum Mast Fall Risk and appropriate interventions in the flowsheet.   Outcome: Progressing Towards Goal  Note: Fall Risk Interventions:                                Problem: Patient Education: Go to Patient Education Activity  Goal: Patient/Family Education  Outcome: Progressing Towards Goal

## 2023-02-10 ENCOUNTER — APPOINTMENT (OUTPATIENT)
Dept: GENERAL RADIOLOGY | Age: 48
End: 2023-02-10
Attending: SPECIALIST
Payer: COMMERCIAL

## 2023-02-10 VITALS
OXYGEN SATURATION: 100 % | WEIGHT: 238.1 LBS | SYSTOLIC BLOOD PRESSURE: 125 MMHG | DIASTOLIC BLOOD PRESSURE: 65 MMHG | BODY MASS INDEX: 42.19 KG/M2 | RESPIRATION RATE: 16 BRPM | HEART RATE: 98 BPM | TEMPERATURE: 98.1 F | HEIGHT: 63 IN

## 2023-02-10 PROCEDURE — 74011000250 HC RX REV CODE- 250: Performed by: SPECIALIST

## 2023-02-10 PROCEDURE — 74240 X-RAY XM UPR GI TRC 1CNTRST: CPT

## 2023-02-10 PROCEDURE — C9113 INJ PANTOPRAZOLE SODIUM, VIA: HCPCS | Performed by: SPECIALIST

## 2023-02-10 PROCEDURE — 74011250636 HC RX REV CODE- 250/636: Performed by: SPECIALIST

## 2023-02-10 PROCEDURE — 77010033678 HC OXYGEN DAILY

## 2023-02-10 PROCEDURE — 74011000636 HC RX REV CODE- 636: Performed by: SPECIALIST

## 2023-02-10 RX ORDER — OXYCODONE AND ACETAMINOPHEN 5; 325 MG/1; MG/1
1-2 TABLET ORAL
Status: DISCONTINUED | OUTPATIENT
Start: 2023-02-10 | End: 2023-02-10 | Stop reason: HOSPADM

## 2023-02-10 RX ORDER — KETOROLAC TROMETHAMINE 15 MG/ML
15 INJECTION, SOLUTION INTRAMUSCULAR; INTRAVENOUS EVERY 6 HOURS
Status: DISCONTINUED | OUTPATIENT
Start: 2023-02-10 | End: 2023-02-10 | Stop reason: HOSPADM

## 2023-02-10 RX ORDER — ENOXAPARIN SODIUM 100 MG/ML
40 INJECTION SUBCUTANEOUS EVERY 12 HOURS
Qty: 20 EACH | Refills: 0 | Status: SHIPPED
Start: 2023-02-10 | End: 2023-02-20

## 2023-02-10 RX ADMIN — DIATRIZOATE MEGLUMINE AND DIATRIZOATE SODIUM 50 ML: 660; 100 LIQUID ORAL; RECTAL at 08:04

## 2023-02-10 RX ADMIN — KETOROLAC TROMETHAMINE 15 MG: 15 INJECTION, SOLUTION INTRAMUSCULAR; INTRAVENOUS at 02:22

## 2023-02-10 RX ADMIN — KETOROLAC TROMETHAMINE 15 MG: 15 INJECTION, SOLUTION INTRAMUSCULAR; INTRAVENOUS at 08:42

## 2023-02-10 RX ADMIN — SODIUM CHLORIDE 40 MG: 9 INJECTION, SOLUTION INTRAMUSCULAR; INTRAVENOUS; SUBCUTANEOUS at 08:42

## 2023-02-10 RX ADMIN — ONDANSETRON 4 MG: 2 INJECTION INTRAMUSCULAR; INTRAVENOUS at 08:56

## 2023-02-10 RX ADMIN — SODIUM CHLORIDE, POTASSIUM CHLORIDE, SODIUM LACTATE AND CALCIUM CHLORIDE 150 ML/HR: 600; 310; 30; 20 INJECTION, SOLUTION INTRAVENOUS at 02:26

## 2023-02-10 RX ADMIN — MORPHINE SULFATE 6 MG: 10 INJECTION INTRAVENOUS at 08:42

## 2023-02-10 RX ADMIN — ENOXAPARIN SODIUM 40 MG: 100 INJECTION SUBCUTANEOUS at 06:03

## 2023-02-10 NOTE — DISCHARGE INSTRUCTIONS
[facility]  Jayla Echols. Skyler Aguilar M.D., F.A.C.S.  [address]. [address] [place] [address] Rd, [address] [facility]  Office: [phone]    Fax:  [phone]    Discharge Instruction for Gastric Bypass / Sleeve Gastrectomy Patients    Diet:  Continue with the liquid diet until you are seen in the office. Make sure you sip fluids all day. Aim for  Gms of protein every day. Activity:  Walking is encouraged. Rest when you are tired. You may shower. You may climb stairs. Take your time. No lifting more than 10-15 lbs. If you feel discomfort during an activity, rest.  Do not drive for 1 week. Wound Care:  Clean incisions with soap and water when in the shower. Pat dry. Leave steri-strips on until they fall off. A small amount of drainage may be present from the incisions. Contact the office if you notice an increase in drainage, an odor, increased redness, or fever > 100.5. Medications: You will receive a prescription for pain medication at the time of discharge. For upset stomach you may take over the counter medications such as Maalox, Mylanta, Pepcid, Zantac, or Tagamet. You may take Tylenol  Non-aspirin based arthritis medications may be resumed after the first month. Take a childrens or adult chewable multivitamin. Milk of Magnesia will help with constipation. It is fine to take your usual home medications. Blood pressure medications should be continued after surgery. Diabetic medications can frequently be reduced very quickly after surgery. Diabetics should continue to monitor blood sugars frequently after surgery and contact the prescribing physician for any questions. Follow-Up Appointment:  If you do not already have a follow-up appointment scheduled, contact the office in the next few days to obtain one. It is usually scheduled for 10-14 days after you surgery date. Office phone number:  [phone].         REV  09/2010

## 2023-02-10 NOTE — ROUTINE PROCESS
Bedside and Verbal shift change report given to Hayes Payan RN (oncoming nurse) by Rafa Sarah RN (offgoing nurse). Report included the following information SBAR, Kardex, Intake/Output, MAR, and Recent Results.

## 2023-02-10 NOTE — ROUTINE PROCESS
Bedside and Verbal shift change report given to ROMAINE House (oncoming nurse) by Ian Chaudhry RN (offgoing nurse). Report included the following information SBAR, Kardex, Intake/Output, MAR, and Recent Results.

## 2023-02-10 NOTE — PROGRESS NOTES
Bedside and Verbal shift change report given to [address] [place] [address] (oncoming nurse) by Christopher Jacobs RN (offgoing nurse). Report included the following information SBAR, Procedure Summary, Intake/Output, MAR, and Recent Results.

## 2023-02-10 NOTE — ROUTINE PROCESS
Bedside and Verbal shift change report given to Micheal Huff RN (oncoming nurse) by Estela Dorsey RN (offgoing nurse). Report included the following information SBAR, Kardex, Intake/Output, MAR, and Recent Results. Arava Pregnancy And Lactation Text: This medication is Pregnancy Category X and is absolutely contraindicated during pregnancy. It is unknown if it is excreted in breast milk.

## 2023-02-10 NOTE — DISCHARGE INSTRUCTIONS
[facility]  Celos Rutherford. Venus Merlos M.D., F.A.C.S.  [address]. [address] [place] [address] Rd, [address] [facility]  Office: [phone]    Fax:  [phone]    Discharge Instruction for Gastric Bypass / Sleeve Gastrectomy Patients    Diet:  Continue with the liquid diet until you are seen in the office. Make sure you sip fluids all day. Aim for  Gms of protein every day. Activity:  Walking is encouraged. Rest when you are tired. You may shower. You may climb stairs. Take your time. No lifting more than 10-15 lbs. If you feel discomfort during an activity, rest.  Do not drive for 1 week. Wound Care:  Clean incisions with soap and water when in the shower. Pat dry. Leave steri-strips on until they fall off. A small amount of drainage may be present from the incisions. Contact the office if you notice an increase in drainage, an odor, increased redness, or fever > 100.5. Medications: You will receive a prescription for pain medication at the time of discharge. For upset stomach you may take over the counter medications such as Maalox, Mylanta, Pepcid, Zantac, or Tagamet. You may take Tylenol  Non-aspirin based arthritis medications may be resumed after the first month. Take a childrens or adult chewable multivitamin. Milk of Magnesia will help with constipation. It is fine to take your usual home medications. Blood pressure medications should be continued after surgery. Diabetic medications can frequently be reduced very quickly after surgery. Diabetics should continue to monitor blood sugars frequently after surgery and contact the prescribing physician for any questions. Follow-Up Appointment:  If you do not already have a follow-up appointment scheduled, contact the office in the next few days to obtain one. It is usually scheduled for 10-14 days after you surgery date. Office phone number:  [phone].         REV  09/2010

## 2023-02-10 NOTE — NURSE NAVIGATOR
Patient sitting on side of bed sipping water and tolerating well. Vitals:   Blood pressure 125/65, pulse 98, temperature 98.1 °F (36.7 °C), resp. rate 16, height 5' 3\" (1.6 m), weight 108 kg (238 lb 1.6 oz), SpO2 100 %. Output: reviewed, and patient verified urinating clear, yellow urine. Pulmonary: Clear in all lobes  Cardiac: Regular rate and rhythm  Abdomen: Bowel sounds hypo-active x4. Lap sites without erythema, swelling,  and/or drainage. NYLA drain with a small amount of serosanguinous drainage. SCD's: Positioned and operating WNL    Patient with expected pain, but is being managed and is currently tolerable. No nausea and/or vomiting. Patient has been ambulating the halls. Patient has not had the opportunity to swallow pills. Post-op diet progression discussed with patient. Patient to be discharged on a bariatric full liquid diet. Patient verbalized understanding of liquid diet for next two weeks until first post-op visit. Goal of four ounces per hour with one ounce being protein was clearly understood. Medications were discussed (i.e., pain- Percocet, Tylenol, not to use aspirin or ibuprofen based products, as well as steroids). Constipation was discussed and ways to alleviate it were discussed. Education completed on IS use and to ambulate every hour when at home. Patient completed a return demonstration on IS with no issues or concerns from this RN. Lovenox and Percocet filled and in the home. Lovenox and Percocet education provided, and patient verbalized understanding. Patient has chewable multi-vitamin, probiotic, and Prilosec in the home; they were reviewed. Ketosis was also reviewed. Patient given a report card to record intake and a handout to support bedside education. All questions were answered by this RN, and patient verbalized understanding to all education provided. Goals for discharge were discussed, and patient verbalized understanding. Post-op follow-up appt. lynette scheduled.

## 2023-02-10 NOTE — DISCHARGE SUMMARY
Discharge Summary    Patient: Claudette Vaughan               Sex: female          DOA: 2/9/2023         YOB: 1975      Age:  56 y.o.        LOS:  LOS: 1 day                Admit Date: 2/9/2023    Discharge Date: 2/10/2023    Admission Diagnoses: Morbid obesity with BMI of 40.0-44.9, adult ([facility] [E66.01, Z68.41]    Discharge Diagnoses:    Problem List as of 2/10/2023 Date Reviewed: 2/9/2023            Codes Class Noted - Resolved    Morbid obesity with BMI of 40.0-44.9, adult ([facility] ICD-10-CM: E66.01, Z68.41  ICD-9-CM: 278.01, V85.41  2/9/2023 - Present        Morbid obesity ([facility] ICD-10-CM: E66.01  ICD-9-CM: 278.01  9/19/2022 - Present        Morbid obesity with BMI of 45.0-49.9, adult ([facility] ICD-10-CM: E66.01, Z68.42  ICD-9-CM: 278.01, V85.42  9/19/2022 - Present        High cholesterol ICD-10-CM: E78.00  ICD-9-CM: 272.0  9/19/2022 - Present        Arthritis ICD-10-CM: M19.90  ICD-9-CM: 716.90  9/19/2022 - Present        Gallstones ICD-10-CM: K80.20  ICD-9-CM: 574.20  9/19/2022 - Present        GERD (gastroesophageal reflux disease) ICD-10-CM: K21.9  ICD-9-CM: 530.81  9/19/2022 - Present        Anxiety ICD-10-CM: F41.9  ICD-9-CM: 300.00  9/19/2022 - Present        Kidney stones ICD-10-CM: N20.0  ICD-9-CM: 592.0  9/19/2022 - Present        DJD (degenerative joint disease), cervical ICD-10-CM: U19.541  ICD-9-CM: 721.0  9/19/2022 - Present        Obstructive sleep apnea syndrome ICD-10-CM: G47.33  ICD-9-CM: 327.23  2020 - Present    Overview Signed 9/19/2022 10:54 AM by Jh Cleaning NP     mild, no device indicated             Hypertension ICD-10-CM: I10  ICD-9-CM: 401.9  2017 - Present           Discharge Condition: Good    Hospital Course: The patient underwent  laparoscopic gastric bypass surgery  on 2/9/2023. The patient tolerated the procedure well. Vital signs remained stable and the patient was transferred to  3rd floor surgical unit without complications.  The patient remained stable throughout the first night post operatively with stable vital signs and adequate urine output and pain control. Pain was controlled with Dilaudid IV and IV Tylenol . The patient on the first morning post operative was transferred to the radiology suite where they underwent a gastrograffin UGI study which showed no evidence of a leak or stricture. The drain was discontinued on POD # 1 and the patient was started on a bariatric liquid diet with protein shakes. The patient progressed throughout the day and was ambulating well and tolerating their diet. They were therefore discharged home with instructions to notify me with any issues that may arise. Significant Diagnostic Studies:   No results for input(s): HGB, HGBEXT in the last 72 hours. No results for input(s): HCT, HCTEXT in the last 72 hours. Current Discharge Medication List        CONTINUE these medications which have CHANGED    Details   enoxaparin (LOVENOX) 40 mg/0.4 mL 0.4 mL by SubCUTAneous route every twelve (12) hours for 10 days. Indications: deep vein thrombosis prevention  Qty: 20 Each, Refills: 0  Start date: 2/10/2023, End date: 2/20/2023           CONTINUE these medications which have NOT CHANGED    Details   multivitamin with iron (FLINTSTONES) chewable tablet Take 1 Tablet by mouth daily. Lactobacillus acidophilus (Probiotic) 10 billion cell cap Take 2 Capsules by mouth daily. azelastine (ASTELIN) 137 mcg (0.1 %) nasal spray 1 [place] by Both Nostrils route two (2) times a day. Indications: seasonal runny nose      clonazePAM (KlonoPIN) 1 mg tablet Take 1 mg by mouth nightly as needed for Anxiety or Sleep. Venlafaxine-ER 24 HR (EFFEXOR-ER) 225 mg tablet Take 225 mg by mouth daily. Indications: anxiousness associated with depression      scopolamine (TRANSDERM-SCOP) 1 mg over 3 days pt3d 1 Patch by TransDERmal route every seventy-two (72) hours. Qty: 1 Patch, Refills: 0      amLODIPine (NORVASC) 5 mg tablet Take 5 mg by mouth nightly. Indications: high blood pressure      ARIPiprazole (ABILIFY) 2 mg tablet Take 2 mg by mouth daily. Indications: anxiety      cetirizine (ZYRTEC) 5 mg tablet Take 5 mg by mouth nightly. Indications: seasonal runny nose      propranoloL (INDERAL) 10 mg tablet Take 10 mg by mouth two (2) times a day. Indications: high blood pressure      rosuvastatin (CRESTOR) 10 mg tablet Take 10 mg by mouth nightly. Indications: high cholesterol      ondansetron (ZOFRAN ODT) 8 mg disintegrating tablet Take 1 Tablet by mouth every eight (8) hours as needed for Nausea or Vomiting. Qty: 30 Tablet, Refills: 0      pantoprazole (PROTONIX) 40 mg tablet Take 40 mg by mouth nightly. Indications: gastroesophageal reflux disease             Activity: activity as tolerated with no heavy lifting of greater than 20 pounds. No anti- inflammatory medications. Use stool softeners at home as needed while taking pain medications since they are constipating. Diet: Bariatric liquid diet    Wound Care: Keep wound clean and dry, Reinforce dressing PRN and ice to area for comfort. Do not get wound wet for 2 days.     Follow-up: 14 days with Dr Ramona White M.D

## 2023-02-10 NOTE — PROGRESS NOTES
1931- Bedside report completed. Pt is in bed at this time with family at the bedside. A&O x4. Opportunity for questions and concerns provided. Bed is locked, low, and call light is within reach. 2057 - Patient in bed at this time. A/O x 4. 18G IV to LUE, CDI,  intact, patent, infusing, no s/s of infection or infiltration. TEDs and SCDs to BLE. Steri strips and medipore tape dressing to 5 lap sites to the abdomen, CDI. NYLA drain is intact and draining. Denies numbness/tingling. Pedal and radial pulses palpable. Lungs clear. Bowel sounds active to all quadrants. Pain 2/10, pt stated pain its tolerable at this time. Bed is locked, low, and call light is within reach. 2202- Pt reported pain is 7/10, requested PRN pain med, see MAR. No other concerns voiced or noted at this time. Bed is locked, low, and call light is within reach. 2302- Pain is now at 2/10. No other concerns voiced or noted at this time. Bed is locked, low, and call light is within reach. 0435- Pt is sleeping at this time. Woken up for care rounds and encouraged to walk before the end of shift. 7700- Villalpando was removed per MD orders. Pt tolerated well and catheter is intact. 0630- Pt is ambulating the hallway with a steady gate. Shift summary- Pt had an uneventful night and slept on and off throughout the night.

## 2023-02-10 NOTE — PROGRESS NOTES
1931- Bedside report completed. Pt is in bed at this time with family at the bedside. A&O x4. Opportunity for questions and concerns provided. Bed is locked, low, and call light is within reach. 2057 - Patient in bed at this time. A/O x 4. 18G IV to LUE, CDI,  intact, patent, infusing, no s/s of infection or infiltration. TEDs and SCDs to BLE. Steri strips and medipore tape dressing to 5 lap sites to the abdomen, CDI. NYLA drain is intact and draining. Denies numbness/tingling. Pedal and radial pulses palpable. Lungs clear. Bowel sounds active to all quadrants. Pain 2/10, pt stated pain its tolerable at this time. Bed is locked, low, and call light is within reach. 2202- Pt reported pain is 7/10, requested PRN pain med, see MAR. No other concerns voiced or noted at this time. Bed is locked, low, and call light is within reach. 2302- Pain is now at 2/10. No other concerns voiced or noted at this time. Bed is locked, low, and call light is within reach. 0435- Pt is sleeping at this time. Woken up for care rounds and encouraged to walk before the end of shift. 3594- Villalpando was removed per MD orders. Pt tolerated well and catheter is intact. 0630- Pt is ambulating the hallway with a steady gate. Shift summary- Pt had an uneventful night and slept on and off throughout the night.

## 2023-02-10 NOTE — PROGRESS NOTES
Bedside and Verbal shift change report given to [address] [place] [address] (oncoming nurse) by John Guillermo RN (offgoing nurse). Report included the following information SBAR, Procedure Summary, Intake/Output, MAR, and Recent Results.

## 2023-02-10 NOTE — ROUTINE PROCESS
Bedside and Verbal shift change report given to Janet Hernandez RN (oncoming nurse) by Demetria Mares RN (offgoing nurse). Report included the following information SBAR, Kardex, Intake/Output, MAR, and Recent Results.

## 2023-02-10 NOTE — PROGRESS NOTES
7951  AVS review with pt, opportunity for questions and concerns at this time. D/c IV clean and dry. Properly discarded armbands.

## 2023-02-10 NOTE — PROGRESS NOTES
6289  AVS review with pt, opportunity for questions and concerns at this time. D/c IV clean and dry. Properly discarded armbands.

## 2023-02-10 NOTE — DISCHARGE SUMMARY
Discharge Summary    Patient: Darryl Tucker               Sex: female          DOA: 2/9/2023         YOB: 1975      Age:  56 y.o.        LOS:  LOS: 1 day                Admit Date: 2/9/2023    Discharge Date: 2/10/2023    Admission Diagnoses: Morbid obesity with BMI of 40.0-44.9, adult ([facility] [E66.01, Z68.41]    Discharge Diagnoses:    Problem List as of 2/10/2023 Date Reviewed: 2/9/2023            Codes Class Noted - Resolved    Morbid obesity with BMI of 40.0-44.9, adult ([facility] ICD-10-CM: E66.01, Z68.41  ICD-9-CM: 278.01, V85.41  2/9/2023 - Present        Morbid obesity ([facility] ICD-10-CM: E66.01  ICD-9-CM: 278.01  9/19/2022 - Present        Morbid obesity with BMI of 45.0-49.9, adult ([facility] ICD-10-CM: E66.01, Z68.42  ICD-9-CM: 278.01, V85.42  9/19/2022 - Present        High cholesterol ICD-10-CM: E78.00  ICD-9-CM: 272.0  9/19/2022 - Present        Arthritis ICD-10-CM: M19.90  ICD-9-CM: 716.90  9/19/2022 - Present        Gallstones ICD-10-CM: K80.20  ICD-9-CM: 574.20  9/19/2022 - Present        GERD (gastroesophageal reflux disease) ICD-10-CM: K21.9  ICD-9-CM: 530.81  9/19/2022 - Present        Anxiety ICD-10-CM: F41.9  ICD-9-CM: 300.00  9/19/2022 - Present        Kidney stones ICD-10-CM: N20.0  ICD-9-CM: 592.0  9/19/2022 - Present        DJD (degenerative joint disease), cervical ICD-10-CM: O04.431  ICD-9-CM: 721.0  9/19/2022 - Present        Obstructive sleep apnea syndrome ICD-10-CM: G47.33  ICD-9-CM: 327.23  2020 - Present    Overview Signed 9/19/2022 10:54 AM by Natasha Tang NP     mild, no device indicated             Hypertension ICD-10-CM: I10  ICD-9-CM: 401.9  2017 - Present           Discharge Condition: Good    Hospital Course: The patient underwent  laparoscopic gastric bypass surgery  on 2/9/2023. The patient tolerated the procedure well. Vital signs remained stable and the patient was transferred to  3rd floor surgical unit without complications.  The patient remained stable throughout the first night post operatively with stable vital signs and adequate urine output and pain control. Pain was controlled with Dilaudid IV and IV Tylenol . The patient on the first morning post operative was transferred to the radiology suite where they underwent a gastrograffin UGI study which showed no evidence of a leak or stricture. The drain was discontinued on POD # 1 and the patient was started on a bariatric liquid diet with protein shakes. The patient progressed throughout the day and was ambulating well and tolerating their diet. They were therefore discharged home with instructions to notify me with any issues that may arise. Significant Diagnostic Studies:   No results for input(s): HGB, HGBEXT in the last 72 hours. No results for input(s): HCT, HCTEXT in the last 72 hours. Current Discharge Medication List        CONTINUE these medications which have CHANGED    Details   enoxaparin (LOVENOX) 40 mg/0.4 mL 0.4 mL by SubCUTAneous route every twelve (12) hours for 10 days. Indications: deep vein thrombosis prevention  Qty: 20 Each, Refills: 0  Start date: 2/10/2023, End date: 2/20/2023           CONTINUE these medications which have NOT CHANGED    Details   multivitamin with iron (FLINTSTONES) chewable tablet Take 1 Tablet by mouth daily. Lactobacillus acidophilus (Probiotic) 10 billion cell cap Take 2 Capsules by mouth daily. azelastine (ASTELIN) 137 mcg (0.1 %) nasal spray 1 [place] by Both Nostrils route two (2) times a day. Indications: seasonal runny nose      clonazePAM (KlonoPIN) 1 mg tablet Take 1 mg by mouth nightly as needed for Anxiety or Sleep. Venlafaxine-ER 24 HR (EFFEXOR-ER) 225 mg tablet Take 225 mg by mouth daily. Indications: anxiousness associated with depression      scopolamine (TRANSDERM-SCOP) 1 mg over 3 days pt3d 1 Patch by TransDERmal route every seventy-two (72) hours. Qty: 1 Patch, Refills: 0      amLODIPine (NORVASC) 5 mg tablet Take 5 mg by mouth nightly. Indications: high blood pressure      ARIPiprazole (ABILIFY) 2 mg tablet Take 2 mg by mouth daily. Indications: anxiety      cetirizine (ZYRTEC) 5 mg tablet Take 5 mg by mouth nightly. Indications: seasonal runny nose      propranoloL (INDERAL) 10 mg tablet Take 10 mg by mouth two (2) times a day. Indications: high blood pressure      rosuvastatin (CRESTOR) 10 mg tablet Take 10 mg by mouth nightly. Indications: high cholesterol      ondansetron (ZOFRAN ODT) 8 mg disintegrating tablet Take 1 Tablet by mouth every eight (8) hours as needed for Nausea or Vomiting. Qty: 30 Tablet, Refills: 0      pantoprazole (PROTONIX) 40 mg tablet Take 40 mg by mouth nightly. Indications: gastroesophageal reflux disease             Activity: activity as tolerated with no heavy lifting of greater than 20 pounds. No anti- inflammatory medications. Use stool softeners at home as needed while taking pain medications since they are constipating. Diet: Bariatric liquid diet    Wound Care: Keep wound clean and dry, Reinforce dressing PRN and ice to area for comfort. Do not get wound wet for 2 days.     Follow-up: 14 days with Dr Elaine Berry M.D

## 2023-02-10 NOTE — PROGRESS NOTES
Bariatric Surgery                POD #1 (seen in xray)    Visit Vitals  /66 (BP 1 Location: Right upper arm, BP Patient Position: At rest;Sitting)   Pulse 98   Temp 100.1 °F (37.8 °C)   Resp 16   Ht 5' 3\" (1.6 m)   Wt 108 kg (238 lb 1.6 oz)   SpO2 100%   BMI 42.18 kg/m²     Patient has minimal complaints of pain, minimal nausea noted    UGI - pending    Data Review:    Labs: Results:       Chemistry No results for input(s): GLU, NA, K, CL, CO2, BUN, CREA, CA, AGAP, BUCR, TBIL, AP, TP, ALB, GLOB, AGRAT in the last 72 hours. No lab exists for component: GPT   CBC w/Diff No results for input(s): WBC, RBC, HGB, HCT, PLT, GRANS, LYMPH, EOS, RETIC, HGBEXT, HCTEXT, PLTEXT in the last 72 hours. Coagulation No results for input(s): PTP, INR, APTT, INREXT in the last 72 hours. Liver Enzymes No results for input(s): TP, ALB, TBIL, AP in the last 72 hours. No lab exists for component: SGOT, GPT, DBIL       Assessment/Plan: S/P  laparoscopic gastric bypass surgery - doing well without any issues. Following orders after UGI confirmed normal -     1. Start bariatric diet and protein shakes  2. D/C IV pain meds and start PO pain meds  3. D/C NYLA drain  4. Likley PM D/C if  Cont ok and tolerate PO

## 2023-02-14 ENCOUNTER — TELEPHONE (OUTPATIENT)
Age: 48
End: 2023-02-14

## 2023-02-14 NOTE — TELEPHONE ENCOUNTER
This RN spoke with patient post-operatively. Hydration: Patient hydrated with 96+ ounces as of yesterday. Nausea and/or vomitting: None    Pain: Currently managed with Percocet at night and/or Tylenol during the day    Lovenox injections: Administering every 12 hours, rotating sites. RN reminded patient to complete all injections, in which patient verbalized understanding. Lap sites: No erythema, drainage, and/or swelling    JOSE drain site: No drainage; dressing removed    BM: Loose stools on the 12th and none since    Ambulation: Patient is walking throughout house every hour. IS: Patient continues to use 10x's per hour while awake. She has reached 2,250 mL. Temperature: 97.9 degrees    Pulse: 100 bpm    Medications: (confirmed currently taking)   *Multi-vitamin: yes   *Probiotic: yes   *Protonix: yes    Questions: None    This RN reminded the patient to contact the office with any questions and/or concerns. Patient verbalized understanding. Patient's two week post-op visit is scheduled and was confirmed.

## 2023-02-21 ENCOUNTER — HOSPITAL ENCOUNTER (OUTPATIENT)
Facility: HOSPITAL | Age: 48
Discharge: HOME | End: 2023-02-24

## 2023-02-21 NOTE — PROGRESS NOTES
Nutrition Note      Spoke with Arleen Orona  today. Pt is approx. 2 weeks S/P laparoscopic gastric bypass procedure. Tolerating liquid diet very well. Protein shake intake: 2 Premier RTD/Isopure (powder mixed with water) protein supplement shakes/day. Fluid intake: 100 oz/day. Foods being consumed include Oikos pro yogurt, broth, bone broth, sf jello, low-fat cream of chicken/mushroom (strained and diluted with water), and Bariwise cream of mushroom (15 g protein) soup. No complaints. Wt: 222.2 lbs    Pre-op Wt: 239 lbs    EBW: 98  lbs   17 % EBWL    Activity: Pt reports brisk walking with dog for 25 min/d x 3-4 d/wk. Pt states that she plans to start going back to the gym and use the elliptical machine, starting tonight. Pt reports slight incisional site pain with exertion, treated with Tylenol. Supplements:  [x] Clay's Complete Chewable MVI BID  [x] Probiotic QD  [x]Protonix QD    Pt given one on one diet education over the phone. Reviewed diet progression for weeks 3-4. Patient appears to have a good understanding of the diet progression, food choices, and dietary/exercise habits for successful weight loss and nourishment after surgery. Reviewed pp. 32-45 of the patient education book. Discussed: post-op diet progression, including soft/pureed high protein, low fat, low sugar food recommendations; proper food group choices;  consumption of food and liquids, and consumption of adequate no-sugar, no-caffeine, no carbonation liquids. We reviewed appropriate food choices, meal adaptations (use of smaller dishes/utensils, eat slowly and chewing sufficiently for digestion), cooking techniques, and eating behavior modifications. Discussed intake regimen with 3 meals and 2-3 protein supplements per day. Additionally, intake timing - to include consuming a meal or snack every 3-4 hrs, the 30:30 rule, and having a meal within 2 hours of waking .  Reinforced the importance of continued vitamin & probiotic consumption, adequate fluid with goal of 64 oz per day and adequate protein with goal of  grams per day. Stressed the importance of 30 min of physical activity each day, as tolerated, with restricted lifting, to improve post op weight loss results and bowel function. Pt voiced understanding through repeating the information back to me. All pt nutrition-related questions answered. Reminded pt of their appointment for their 2 week post-op medical evaluation  on 2/22/23   at  2:30 pm.  Additionally reminded pt that a RD would be calling them again at their 1 mo. post-op jasmin. Pt provided with RD contact information for nutritional questions or concerns between now and then.     RD: Virgil Arreguin MS, RD      Electronically signed by Virgil Arreguin RD on 2/21/23 at 9:27 AM EST

## 2023-02-22 ENCOUNTER — OFFICE VISIT (OUTPATIENT)
Age: 48
End: 2023-02-22

## 2023-02-22 ENCOUNTER — TELEPHONE (OUTPATIENT)
Age: 48
End: 2023-02-22

## 2023-02-22 VITALS
BODY MASS INDEX: 39.78 KG/M2 | SYSTOLIC BLOOD PRESSURE: 129 MMHG | TEMPERATURE: 98.1 F | HEIGHT: 63 IN | WEIGHT: 224.5 LBS | DIASTOLIC BLOOD PRESSURE: 79 MMHG | HEART RATE: 98 BPM | OXYGEN SATURATION: 100 %

## 2023-02-22 DIAGNOSIS — Z98.84 S/P GASTRIC BYPASS: ICD-10-CM

## 2023-02-22 DIAGNOSIS — K90.9 INTESTINAL MALABSORPTION, UNSPECIFIED TYPE: ICD-10-CM

## 2023-02-22 DIAGNOSIS — Z09 POSTOPERATIVE EXAMINATION: Primary | ICD-10-CM

## 2023-02-22 DIAGNOSIS — K80.20 GALLSTONES: ICD-10-CM

## 2023-02-22 PROCEDURE — 99024 POSTOP FOLLOW-UP VISIT: CPT | Performed by: NURSE PRACTITIONER

## 2023-02-22 RX ORDER — AZELASTINE 1 MG/ML
2 SPRAY, METERED NASAL 2 TIMES DAILY
COMMUNITY
Start: 2021-08-30

## 2023-02-22 RX ORDER — AMLODIPINE BESYLATE 5 MG/1
5 TABLET ORAL
COMMUNITY
Start: 2021-12-13

## 2023-02-22 RX ORDER — ARIPIPRAZOLE 2 MG/1
2 TABLET ORAL DAILY
COMMUNITY
Start: 2022-02-24

## 2023-02-22 RX ORDER — M-VIT,TX,IRON,MINS/CALC/FOLIC 27MG-0.4MG
1 TABLET ORAL DAILY
COMMUNITY

## 2023-02-22 RX ORDER — CLONAZEPAM 1 MG/1
1 TABLET ORAL
COMMUNITY
Start: 2018-07-10

## 2023-02-22 RX ORDER — PROPRANOLOL HYDROCHLORIDE 10 MG/1
10 TABLET ORAL 2 TIMES DAILY
COMMUNITY
Start: 2021-12-13

## 2023-02-22 RX ORDER — URSODIOL 500 MG/1
500 TABLET, FILM COATED ORAL 2 TIMES DAILY
Qty: 60 TABLET | Refills: 5 | Status: SHIPPED | OUTPATIENT
Start: 2023-02-22

## 2023-02-22 RX ORDER — ROSUVASTATIN CALCIUM 10 MG/1
10 TABLET, COATED ORAL
COMMUNITY
Start: 2022-07-14 | End: 2023-07-14

## 2023-02-22 RX ORDER — CETIRIZINE HYDROCHLORIDE 5 MG/1
5 TABLET ORAL
COMMUNITY

## 2023-02-22 RX ORDER — PANTOPRAZOLE SODIUM 40 MG/1
40 TABLET, DELAYED RELEASE ORAL
COMMUNITY
Start: 2022-05-26

## 2023-02-22 NOTE — PROGRESS NOTES
Subjective:      Cristobal David is a 56 y.o. female is now 13 days status post laparoscopic gastric bypass surgery. Doing well overall. She has lost a total of 15 pounds since surgery. Body mass index is 39.78 kg/m². Currently on a liquid diet without difficulty. Taking in 100 oz fluid daily. Sources of protein include protein shakes. 25 min of activity 3-4 days a week, including walking. Bowel movements are alternating loose stools and regularity every other day. The patient is not having any pain. . The patient is  compliant with multivitamins. Surgery related complications: NA.  Liver bx report reviewed with patient. Ambulatory Bariatric Summary 2/22/2023 2/10/2023 2/10/2023 2/10/2023 2/10/2023 2/10/2023 5/6/8094   Systolic 386 - [phone] 044   Diastolic 79 - 65 66 65 70 73   Pulse 98 - 98 98 90 87 81   Temp 98.1 - - - - - -   Weight 224.5 238.1 - - - - -   Height 62.99 62.992 - - - - -   BMI 39.9 kg/m2 42.3 kg/m2 - - - - -          Comorbidities:      Hypertension: improved, on Rx, denies lightheadedness or dizziness  Diabetes: N/A  Obstructive Sleep Apnea: unchanged, no device prior to surgery  Hyperlipidemia: unchanged, on statin  Stress Urinary Incontinence: N/A  Gastroesophageal Reflux:improved, on PPI  Weight related arthropathy:unchanged    Denies diagnosis of COVID-19 since surgery.      Patient Active Problem List   Diagnosis    Morbid obesity ([facility] [address]    Morbid obesity with BMI of 45.0-49.9, adult ([facility] [address]    High cholesterol    Arthritis    Gallstones    GERD (gastroesophageal reflux disease)    Anxiety    Hypertension    Kidney stones    Obstructive sleep apnea syndrome    DJD (degenerative joint disease), cervical    Morbid obesity with BMI of 40.0-44.9, adult [facility])        Past Medical History:   Diagnosis Date    Anxiety and depression 2012    on meds    Arthritis 2021    OA-knees, back    DJD (degenerative joint disease), cervical 2021    Environmental and seasonal allergies 2014 Gallstones 06/2022    on x-ray    GERD (gastroesophageal reflux disease) 2021    UGI with erosive gastritis, on meds    High cholesterol 2019    on statin    Hypertension 2017    Kidney calculi 06/2022    Left-found on x-ray    Kidney stones     Morbid obesity (HCC)     Nausea & vomiting 2017    post op    Obstructive sleep apnea syndrome 2020    mild, no device indicated       Past Surgical History:   Procedure Laterality Date    ANTERIOR CRUCIATE LIGAMENT REPAIR Left 02/2017    COLONOSCOPY  2021    GASTRIC BYPASS SURGERY  02/09/2023    Dr Wandalee Goldberg (CERVIX STATUS UNKNOWN)  02/04/2019    vaginal with A&P Repair       Current Outpatient Medications   Medication Sig Dispense Refill    amLODIPine (NORVASC) 5 MG tablet Take 5 mg by mouth      ARIPiprazole (ABILIFY) 2 MG tablet Take 2 mg by mouth daily      azelastine (ASTELIN) 0.1 % nasal spray 2 sprays by Nasal route 2 times daily      cetirizine (ZYRTEC) 5 MG tablet Take 5 mg by mouth      clonazePAM (KLONOPIN) 1 MG tablet Take 1 mg by mouth.      propranolol (INDERAL) 10 MG tablet Take 10 mg by mouth 2 times daily      rosuvastatin (CRESTOR) 10 MG tablet Take 10 mg by mouth      pantoprazole (PROTONIX) 40 MG tablet Take 40 mg by mouth      Probiotic Product (PROBIOTIC-10 PO) Take by mouth      Multiple Vitamins-Minerals (THERAPEUTIC MULTIVITAMIN-MINERALS) tablet Take 1 tablet by mouth daily       No current facility-administered medications for this visit.        Allergies   Allergen Reactions    Cephalosporins Hives       Review of Symptoms:       General - No history or complaints of unexpected fever or chills  Head/Neck - No history or complaints of headache or dizziness  Cardiac - No history or complaints of chest pain, palpitations, or shortness of breath  Pulmonary - No history or complaints of shortness of breath or productive cough  Gastrointestinal - as noted above  Genitourinary - No history or complaints of hematuria/dysuria or renal lithiasis  Musculoskeletal - No history or complaints of joint  muscular weakness  Hematologic - No history of any bleeding episodes  Neurologic - No history or complaints of  migraine headaches or neurologic symptoms        Objective:     /79 (Site: Left Upper Arm, Position: Sitting, Cuff Size: Large Adult)   Pulse 98   Temp 98.1 °F (36.7 °C)   Ht 5' 2.99\" (1.6 m)   Wt 224 lb 8 oz (101.8 kg)   SpO2 100%   BMI 39.78 kg/m²     General:  alert, appears stated age, cooperative, and no distress   Chest: lungs clear to auscultation, breath sounds equal and symmetric, no rhonchi, rales or wheezes, no accessory muscle use   Cor:   Regular rate and rhythm, S1S2 present, or without murmur or extra heart sounds   Abdomen: soft, bowel sounds active, non-tender, no masses or organomegaly   Incisions:   healing well, no drainage, no erythema, no hernia, no seroma, no swelling, no dehiscence, incision well approximated     LIVER WEDGE, BIOPSY:        LIVER PARENCHYMA WITHOUT SIGNIFICANT PATHOLOGY. NO SIGNIFICANT FIBROSIS OR STEATOSIS    Assessment:   History of Morbid obesity, status post laparoscopic gastric bypass. Doing well postoperatively. Gallstones - know cholelithiasis from CT scan, will dose ursodiol for dissolution of gallstones at 500mg BID and repeat US at 6 months postop. To start medication at 1 month postop    Plan:     Increase exercise 30 minutes daily   Advance diet to soft solid phase. Reminded to measure portions, continue high protein, low carbohydrate diet. Reminded to eat regularly, to eat slowly & not to drink with meals. Refer to the handbook given in class. Start ursodiol at 1 month postop for cholelithiasis prevention if not had cholecystectomy. Stop after 6 months out from surgery. Continue multivitamin   Continue current medications and follow up with PCP for management of regimen.    Encouraged to attend support group   I have discussed this plan with patient and they verbalized understanding  Follow up in 2 weeks or sooner if patient has questions, concerns or worsening of condition, if unable to reach our office, patient should report to the ED. Ms. Herber Bellamy has a reminder for a \"due or due soon\" health maintenance. I have asked that she contact her primary care provider for a follow-up on this health maintenance.

## 2023-03-13 ENCOUNTER — HOSPITAL ENCOUNTER (OUTPATIENT)
Facility: HOSPITAL | Age: 48
Discharge: HOME | End: 2023-03-16

## 2023-03-13 VITALS — WEIGHT: 213.8 LBS | HEIGHT: 63 IN | BODY MASS INDEX: 37.88 KG/M2

## 2023-03-13 NOTE — PROGRESS NOTES
Nutrition Note    Patient is a 56 y.o.  female approx. 1 mo S/P laparoscopic gastric bypass surgery procedure. Vitals:    03/13/23 0915   Weight: 213 lb 12.8 oz (97 kg)   Height: 5' 3\" (1.6 m)        Pt current weight stated, visit was virtual.     Pre-op Wt: 239 lbs  EBW: 98 lbs    Pt has lost 25 lbs since surgery on 2/9/23. Pt has lost 26 % EBW. Pt is currently on a soft food diet without difficulty. Patient is hydrating with  64+ ounces, daily. Patient is tolerating the following sources of protein: 2 Premier/Isopure protein supplement shakes/day, chicken, ground beef, eggs, low fat cheeses, yogurt, salmon, crab, shrimp, and tilapia  . Patient is exercising by using an elliptical machine for 20-30 min/day x 2-3 d/wk. Pt reports that she has not been as consistent as she would like. Patient [] has  [x] has not had any readmissions, re-operations, complications, ED visits, nor IVF at [facility] during her first post-op month. Pt [x]is []is not taking her Protonix. Supplements:  [x] Rock's Complete MVI  BID  [x] Probiotic      Reviewed the following with patient:  Advance diet to solid phase. Reminded to measure portions, continue high protein, low carbohydrate diet. Reminded to eat regularly, to eat slowly & not to drink with meals. Refer to the handbook and video. Continue multi-vitamin with iron and add the following supplements  (as listed in handbook):  Calcium citrate: 1,500 mg/d, taken in doses of 500 mg TID, 2 hours apart from previous calcium citrate doses and MVI doses  Vitamin B-complex: follow directions on supplement, 1-2 capsules daily. Vitamin B12: 1,000 mcg daily, taken sublingually   Vitamin D3: 3,000 IU per day  Can stop probiotic, if desired, or needed for economical reasons. Continue cardio exercise and add resistance exercises. Discussed with patient. Minimum of 150 min vigorous activity/week recommended. Encouraged to attend monthly support group.   Start Reltone Ursodiol as soon as delivered, as directed at 2 wk post-op appt,  stop after all refills completed and pt is completely out of medication, approx. 6 mos post-op. Continue current medications and follow up with PCP for management of regimen. I have discussed this plan with patient, and they verbalized understanding. One-month nutrition video to include the above mentioned education and link for support group e-mailed to patient. RD contact information provided for nutrition-related questions. Follow-up with provider at three-month appointment on 5/11/23 at  11:00 am, or sooner if patient has questions, concerns or worsening of condition. If unable to reach our office, patient should report to the ED. Patient received the nutrition educational folder to include the above mentioned education during their two week post-op appointment in the clinic. All current stated questions answered. Pt has been provided RD contact information for future nutrition-related questions or concerns.     RD: Juliana Mcnamara MS, RD      Electronically signed by Juliana Mcnamraa RD on 3/13/23 at 9:22 AM EDT

## 2023-03-16 ENCOUNTER — TELEPHONE (OUTPATIENT)
Age: 48
End: 2023-03-16

## 2023-03-16 NOTE — TELEPHONE ENCOUNTER
Phone call with pt and she is complaining of being dizzy from a laying down to a standing position every time and sometimes from a sitting position to a standing  position hydration is greater than 64 ounces, no NSAID, no steroids, no vaping or smoking. Pt is on Amlodipine and propanolol. Per Zettie Files NP hold amlodipine and propanolol and make an appt with PCP. Pt expresses understanding.

## 2023-05-11 ENCOUNTER — OFFICE VISIT (OUTPATIENT)
Age: 48
End: 2023-05-11
Payer: COMMERCIAL

## 2023-05-11 VITALS
DIASTOLIC BLOOD PRESSURE: 74 MMHG | BODY MASS INDEX: 34.59 KG/M2 | HEART RATE: 84 BPM | HEIGHT: 63 IN | OXYGEN SATURATION: 100 % | TEMPERATURE: 97.1 F | SYSTOLIC BLOOD PRESSURE: 113 MMHG | WEIGHT: 195.2 LBS

## 2023-05-11 DIAGNOSIS — Z98.84 S/P GASTRIC BYPASS: ICD-10-CM

## 2023-05-11 DIAGNOSIS — K80.20 GALLSTONES: ICD-10-CM

## 2023-05-11 DIAGNOSIS — K90.9 INTESTINAL MALABSORPTION, UNSPECIFIED TYPE: Primary | ICD-10-CM

## 2023-05-11 PROBLEM — E66.01 MORBID OBESITY WITH BMI OF 45.0-49.9, ADULT (HCC): Status: RESOLVED | Noted: 2022-09-19 | Resolved: 2023-05-11

## 2023-05-11 PROBLEM — E66.01 MORBID OBESITY (HCC): Status: RESOLVED | Noted: 2022-09-19 | Resolved: 2023-05-11

## 2023-05-11 PROBLEM — E66.01 MORBID OBESITY WITH BMI OF 40.0-44.9, ADULT (HCC): Status: RESOLVED | Noted: 2023-02-09 | Resolved: 2023-05-11

## 2023-05-11 PROCEDURE — 3074F SYST BP LT 130 MM HG: CPT | Performed by: NURSE PRACTITIONER

## 2023-05-11 PROCEDURE — 99214 OFFICE O/P EST MOD 30 MIN: CPT | Performed by: NURSE PRACTITIONER

## 2023-05-11 PROCEDURE — 3078F DIAST BP <80 MM HG: CPT | Performed by: NURSE PRACTITIONER

## 2023-05-11 RX ORDER — CHOLECALCIFEROL (VITAMIN D3) 125 MCG
500 CAPSULE ORAL DAILY
COMMUNITY

## 2023-05-11 RX ORDER — VITAMIN B COMPLEX
1 CAPSULE ORAL DAILY
COMMUNITY

## 2023-05-11 RX ORDER — CALCIUM CARBONATE 500(1250)
500 TABLET ORAL DAILY
COMMUNITY

## 2023-05-11 RX ORDER — MULTIVIT-MIN/IRON/FOLIC ACID/K 18-600-40
CAPSULE ORAL
COMMUNITY

## 2023-05-11 NOTE — PATIENT INSTRUCTIONS
[employer] or [employer]itness [employer] Kathya  80-90g protein  800-1000 calories   Keep carbs below 50g       You need to get more aggressive with your bowel regimen. Constipation is very common for several reasons including pain medications, protein shakes, decreased intake, etc.    The medications for constipation on this list are available over-the-counter at most drug or grocery stores. GET THINGS MOVING   TAKE 1 capful or packet of Miralax (polyethylene glycol) mixed with at least 8 ounces of water or juice 2 times daily, AND / OR   TAKE 1 tablet of Senna-S (sennosides / docusate) 2 times daily. Once you are regular, you may adjust as needed (for example, stop Senna-S and continue Miralax). If you don't have a BM for a total of 3 days, move to Step 2.     2. KEEP THINGS MOVING   INCREASE Senna-S to 2 tablets 2 times daily, AND CONTINUE Miralax, taking 1 capful or packet mixed with at least 8 ounces of water or juice 2 times daily   Once you are regular, you may adjust as needed. If you don't have a BM for a total of of 5 days, begin Step 3.     3. REALLY GET THINGS MOVING   ADD 1 dose (30 ml), of Milk of Magnesia (magnesium hydroxide). If you are able to have a BM return to Step 2 until you are done using opioids or you have constipation or diarrhea. If you don't have a BM within 8 hours,     ADD 1 tablet (10 mg) of Dulcolax (bisacodyl) OR 1 rectal suppository. If you are able to have a bowel movement return to Step 2. If you don't have a BM,     TAKE another dose of Milk of Magnesia and 1 tablet of Dulcolax. If you are able to have a BM return to Step 2. If you don't have a BM or have continued symptoms, move to Step 4.     4. REALLY, REALLY GET THINGS MOVING   TAKE ½ to 1 bottle of magnesium citrate   Once you finally have a BM, return to Step 2   If you don't have a bowel movement while you are using opioids or symptoms of constipation continue, call the office.      Carolina Keller, JAILENE-BC

## 2023-05-11 NOTE — PROGRESS NOTES
Intestinal malabsorption    S/P gastric bypass        Past Medical History:   Diagnosis Date    Anxiety and depression 2012    on meds    Arthritis 2021    OA-knees, back    DJD (degenerative joint disease), cervical 2021    Environmental and seasonal allergies 2014    Gallstones 06/2022    on x-ray    GERD (gastroesophageal reflux disease) 2021    UGI with erosive gastritis, on meds    High cholesterol 2019    on statin    Hypertension 2017    Kidney calculi 06/2022    Left-found on x-ray    Kidney stones     Morbid obesity (HCC)     Nausea & vomiting 2017    post op    Obstructive sleep apnea syndrome 2020    mild, no device indicated       Past Surgical History:   Procedure Laterality Date    ANTERIOR CRUCIATE LIGAMENT REPAIR Left 02/2017    COLONOSCOPY  2021    GASTRIC BYPASS SURGERY  02/09/2023    Dr Elei Markham (CERVIX STATUS UNKNOWN)  02/04/2019    vaginal with A&P Repair       Current Outpatient Medications   Medication Sig Dispense Refill    calcium carbonate (OSCAL) 500 MG TABS tablet Take 1 tablet by mouth daily      vitamin B-12 (CYANOCOBALAMIN) 500 MCG tablet Take 1 tablet by mouth daily      b complex vitamins capsule Take 1 capsule by mouth daily      Cholecalciferol (VITAMIN D) 50 MCG (2000 UT) CAPS capsule Take by mouth      ARIPiprazole (ABILIFY) 2 MG tablet Take 1 tablet by mouth daily      azelastine (ASTELIN) 0.1 % nasal spray 2 sprays by Nasal route 2 times daily      cetirizine (ZYRTEC) 5 MG tablet Take 1 tablet by mouth      rosuvastatin (CRESTOR) 10 MG tablet Take 1 tablet by mouth      pantoprazole (PROTONIX) 40 MG tablet Take 1 tablet by mouth      Probiotic Product (PROBIOTIC-10 PO) Take by mouth      Multiple Vitamins-Minerals (THERAPEUTIC MULTIVITAMIN-MINERALS) tablet Take 1 tablet by mouth daily      ursodiol (ACTIGALL) 500 MG tablet Take 1 tablet by mouth in the morning and at bedtime 60 tablet 5    amLODIPine (NORVASC) 5 MG tablet Take 5 mg by mouth (Patient not taking:

## 2023-08-11 ENCOUNTER — OFFICE VISIT (OUTPATIENT)
Age: 48
End: 2023-08-11
Payer: COMMERCIAL

## 2023-08-11 VITALS
SYSTOLIC BLOOD PRESSURE: 112 MMHG | BODY MASS INDEX: 31.73 KG/M2 | WEIGHT: 179.1 LBS | DIASTOLIC BLOOD PRESSURE: 70 MMHG | TEMPERATURE: 97.1 F | HEIGHT: 63 IN | HEART RATE: 82 BPM

## 2023-08-11 DIAGNOSIS — K90.9 INTESTINAL MALABSORPTION, UNSPECIFIED TYPE: Primary | ICD-10-CM

## 2023-08-11 DIAGNOSIS — Z98.84 S/P GASTRIC BYPASS: ICD-10-CM

## 2023-08-11 DIAGNOSIS — K80.20 GALLSTONES: ICD-10-CM

## 2023-08-11 PROCEDURE — 3074F SYST BP LT 130 MM HG: CPT | Performed by: NURSE PRACTITIONER

## 2023-08-11 PROCEDURE — 3078F DIAST BP <80 MM HG: CPT | Performed by: NURSE PRACTITIONER

## 2023-08-11 PROCEDURE — 99214 OFFICE O/P EST MOD 30 MIN: CPT | Performed by: NURSE PRACTITIONER

## 2023-08-11 RX ORDER — VENLAFAXINE HYDROCHLORIDE 75 MG/1
CAPSULE, EXTENDED RELEASE ORAL
COMMUNITY
Start: 2023-08-08

## 2023-08-11 NOTE — PATIENT INSTRUCTIONS
Switch to prenatal vitamin with 18mg iron, twice a day    Patient Instructions      Remember hydration goals - minimum of 64 ounces of liquids per day (dehydration is the number one reason for hospital readmission). Continue to monitor carbohydrate and protein intake you need a minimum of  Grams of protein daily- remember to keep your total carbohydrates to 50 grams or less per day for best results. Continue to work towards exercise goals - 60-90 minutes, 5 times a week minimum of deliberate, aerobic exercise is the ultimate goal with strength training 2 times each week. Refer to [employer] for  information. Remember to take vitamins as directed. Attend support group the 2nd Thursday of each month. 6.  Constipation: Milk of Magnesia is for immediate relief only. Miralax is to be used every day if constipation is a chronic problem. 7.  Diarrhea: patients will occasionally develop lactose intolerance after surgery. Check to see if your protein shake has whey in it. If it does try a protein powder or drink that does not have whey and stop all yogurts, cheeses and milks to see if the diarrhea goes away. 8.  If you have had labs drawn. We will only call you if you have abnormal results. Otherwise you can access the lab results in \"mychart\". You will only need the access code the first time you sign on. 9.  Call us at [phone] or email us through [employer]" with questions,     concerns or worsening of condition, we have someone on call 24 hours a day. If you are unable to reach our office, you are to go to your Primary Care Physician or the Emergency Department.      NOTE TO GASTRIC BYPASS PATIENTS:  (SAME APPLIES TO GASTRIC SLEEVE PATIENTS FOR FIRST TWO MONTHS)  Remember that for the rest of your life, you are not able to take the following:  - NSAIDs (ibuprofen, goody powder, BC powder, Motrin, Advil, Mobic, Voltaren, Excedrin, etc.)  - Steroid pills or injections  -

## 2023-08-11 NOTE — PROGRESS NOTES
63  Ferritin 43  Vitamin D 84  Crt 0.52  LFTs WNL          Assessment and Plan:   Intestinal malabsorption  continue required Vitamins: B12, B complex, D, iron, calcium, multivitamin  S/p laparoscopic bariatric surgery,laparoscopic gastric bypass surgery , history of morbid obesity. Reviewed weight loss progress and doing well with 55% EBWL at 6 months postop. Do recommend using food tracking masood to ensure adequate protein and caloric intake. Aim for 80-90 g protein daily and 800-1000 calories. Keep daily carbs below 50g. Continue to increase exercise. Sleep goal is 7-9 hours each night. Patient education given on the effects of sleep deprivation on weight control. Discussed patients weight loss goals and dietary choices in relation to goals. Reminded to measure portions, continue high protein, low carbohydrate diet. Reminded to eat regularly, to eat slowly & not to drink with meals. Continue cardio exercise and add resistance exercises. 60-90 minutes of aerobic activity 5 days a week and strength training 2 days each week. Encouraged to attend support group   Required fluid intake is >64oz daily of decaffeinated sugar free beverages. 3. Gallstones - know cholelithiasis from CT scan, completed higher dose ursodiol for dissolution of gallstones, is non-tender on exam and would like to monitor for symptoms instead of getting US at this time  . Patient to complete labs before next visit. Lab slip given today. I have discussed this plan with patient and they verbalized understanding    30 minutes spent with patient    Follow up in 6 months or sooner if patient has questions, concerns or worsening of condition, if unable to reach our office, patient should report to the ED. Ms. Amparo Al has a reminder for a \"due or due soon\" health maintenance. I have asked that she contact her primary care provider for a follow-up on this health maintenance.

## 2023-08-22 NOTE — ROUTINE PROCESS
1318 - Dressing to NYLA saturated. Dressing changed to gauze/paper tape. Photo Preface (Leave Blank If You Do Not Want): Photographs were obtained today Detail Level: Detailed

## 2023-09-12 DIAGNOSIS — K80.20 GALLSTONES: Primary | ICD-10-CM

## 2023-09-12 DIAGNOSIS — Z98.84 S/P GASTRIC BYPASS: ICD-10-CM

## 2023-09-12 DIAGNOSIS — R10.11 RUQ PAIN: ICD-10-CM

## 2023-09-12 NOTE — PROGRESS NOTES
Placed RUQ US to evaluate pain and rosanna colored stools, know gallstones and has completed higher dose ursodiol following GBP

## 2023-09-20 ENCOUNTER — HOSPITAL ENCOUNTER (OUTPATIENT)
Facility: HOSPITAL | Age: 48
Discharge: HOME | End: 2023-09-23
Payer: COMMERCIAL

## 2023-09-20 DIAGNOSIS — R10.11 RUQ PAIN: ICD-10-CM

## 2023-09-20 DIAGNOSIS — Z98.84 S/P GASTRIC BYPASS: ICD-10-CM

## 2023-09-20 DIAGNOSIS — K80.20 GALLSTONES: ICD-10-CM

## 2023-09-20 PROCEDURE — 76705 ECHO EXAM OF ABDOMEN: CPT

## 2023-09-25 NOTE — ADDENDUM NOTE
Addended by: Clarice Smith on: 1/30/2023 04:00 PM     Modules accepted: Orders Congratulations on reaching another anniversary after transplant! I expect you will have many more!    -You will need a team to care for you the best way possible! For your health, you should follow with your general nephrologist, primary care provider/PCP, dentist and eye doctor, and GYN (for ladies) for routine/preventative care. If you are a diabetic, you should see a podiatrist for regular foot checks. If you need help establishing with one, let us know.    -Remember to keep transplant posted about medication changes or new developments in your health. These may warrant changing your immunosuppression. We are not automatically notified of changes in your condition, so please call or message us with any updates.    -Don't forget we have pharmacist, dietician and social workers that are able to help you, at your request.    -We recommend you stay up to date with vaccines - your primary care provider will help with this, since vaccine recommendations vary by age and get updated regularly. You should get a yearly influenza vaccine. It does not protect you against all infections, and you can still get the flu. The influenza vaccine has been shown to help keep patients from having as severe disease compared to a group of unvaccinated patients.  --Get flu and covid boosters.    DOs and DON'Ts FOR TRANSPLANT PATIENTS USING OVER THE COUNTER REMEDIES    Acne  - Clean affected areas with Panoxyl foaming wash [or any other wash containing 10% benzoyl peroxide] - follow direction on package insert and beware it can bleach fabrics and hair.  - You may also try Differin [adapalene gel  0.1%] to the affected areas as per package insert as well.  - A good moisturizer may be needed if skin dries out. Cetaphil and Cerave make ones often recommended by dermatologists.  - Don't forget to protect your skin from the ski, since you are at increased risk of skin cancer.    Hair Loss - Minoxidil (Rogaine) topically on scalp. Note it  wears off if you stop using, so plan on stayingon it as long as you want the effects to last.    Arthritis   - We do not recommend NSAIDS  by mouth such as Motrin, Advil, Aleve, naprosen, BC powders, etc. They can cause harm to the kidney. If in doubt, please check!  - Acetaminophen up to 3000 mg (3 grams) every 24 hrs is safe. May people try 1 gram (2 extra strength) tabs/caps every 8 hours.  - Topical medicines are OK: Voltaren (diclofenac) gel, lidocaine gel, lidocaine patches, salon pas patches  - Avoid Turmeric (active ingredient: curcumin) as it interacts with your immunosuppression    Fever or pain   - Tylenol (acetaminophen).   - For pain you can try salon pas or lidocaine patches to be applied directly to area of pain.   - Diclofenac [Voltaren] gel is safe to use for a few weeks.    - We do not recommend NSAIDS  by mouth such as Motrin or Advil (ibuprofen), Aleve or naprosen (naproxen), BC powders, etc. If in doubt, please check as there are several others that can be prescribed!    Cough Suppressant - Dextromethorphan Hydrobromide 30 mg or cough drops (Halls or equivalent generic)    Nasal congestion   - Saline nasal spray is very safe.   - Oxymetazoline (Afrin), but should ONLY USE FOR 3 DAYS.   - Topical Vicks vaporub or Vicks nasal inhaler is also acceptable.  - Note tablet form decongestants (Sudafed, phenylephrine) can raise blood pressure and are not recommended    Allergy symptoms - Antihistamines are safe: diphenhydramine (Benadryl), loratadine (Claritin), cetrizine (Zyrtec). These can also help dry up drainage.    For sore throat -  salt water gargles, sore throat sprays like Chloraseptic or sore throat lozenges are safe for temporary relief    For thick secretions (thick mucous) - Guaifenesin (Mucinex), saline nasal spray     To prevent colds - low dose vitamin C is probably OK, but can increase risk of kidney stones. Zinc lozenges (not tablets) taken through the day may help minimize. Let the  lozenge dissolve in the back of your throat. Note: Zinc can inhibit the virus from multiplying in your throat, but it is not proven to prevent colds.    For memory Loss - OK to try Prevagen    Indigestion  - famotidine (Pepcid) 20 mg daily is generally safe. Calcium carbonate (Tums and many other brands) can cause high calcium and should be taken only after taking to your provider about your calcium levels. High calcium can hurt the kidney. Avoid cimetidine (Tagamet) as it can interfere with your immunosuppression and cause toxic levels. A few doses of pepto bismol should be fine, but continued indigestion should be evaluated by your provider.    Nausea or vomiting - these symptoms can indicate there is something wrong with your stomach and should be evaluated if they last more than 6-8 hours OR anytime you cannot keep your medicines down. Not being able to take your medicine can cause complications.     Diarrhea -  Pepto-Bismol, even Metamucil can help liquid stools. Diarrhea can be a sign of infection, so please let your provider know if it continues past 1-2 days for full evaluation. Avoid Imodium unless you have spoken to a provider.    Constipation- Colace, Dulcolax, MiraLax are safe to take regardless of your kidney function.  Please check with the provider before using magnesium or phosphorus containing supplements such as magnesium citrate, milk of magnesium, or Fleet's Phospho-Soda.  These remedies may cause harm, depending on the degree of kidney insufficiency you have.  Your doctor can advise if year magnesium and phosphorus levels are low enough to take these.    Herbal and natural remedies - Some herbals are very safe and effective while others are proven to cause renal failure or interact with your medication. Do NOT take any natural or herbal remedies without discussing with transplant.    If you develop fever or shortness of breath, or start to feel worse, you need to get checked out by a provider in  a clinic or go to ED, depending on the severity of your symptoms.    As always, feel free to ask any questions and raise any concerns regarding your health care.    Best Wishes,  Dr. Mavis Chawla and your entire transplant team

## 2023-09-28 DIAGNOSIS — K80.20 GALLSTONES: Primary | ICD-10-CM

## 2023-09-28 DIAGNOSIS — Z01.812 PRE-OPERATIVE LABORATORY EXAMINATION: ICD-10-CM

## 2023-09-29 ENCOUNTER — HOSPITAL ENCOUNTER (OUTPATIENT)
Facility: HOSPITAL | Age: 48
Discharge: HOME | End: 2023-09-29
Payer: COMMERCIAL

## 2023-09-29 DIAGNOSIS — K80.20 GALLSTONES: ICD-10-CM

## 2023-09-29 DIAGNOSIS — Z01.812 PRE-OPERATIVE LABORATORY EXAMINATION: ICD-10-CM

## 2023-09-29 LAB
ALBUMIN SERPL-MCNC: 3.5 G/DL (ref 3.4–5)
ALBUMIN/GLOB SERPL: 1.1 (ref 0.8–1.7)
ALP SERPL-CCNC: 98 U/L (ref 45–117)
ALT SERPL-CCNC: 33 U/L (ref 13–56)
ANION GAP SERPL CALC-SCNC: 4 MMOL/L (ref 3–18)
AST SERPL-CCNC: 19 U/L (ref 10–38)
BASOPHILS # BLD: 0 K/UL (ref 0–0.1)
BASOPHILS NFR BLD: 1 % (ref 0–2)
BILIRUB SERPL-MCNC: 0.3 MG/DL (ref 0.2–1)
BUN SERPL-MCNC: 19 MG/DL (ref 7–18)
BUN/CREAT SERPL: 35 (ref 12–20)
CALCIUM SERPL-MCNC: 8.7 MG/DL (ref 8.5–10.1)
CHLORIDE SERPL-SCNC: 105 MMOL/L (ref 100–111)
CO2 SERPL-SCNC: 33 MMOL/L (ref 21–32)
CREAT SERPL-MCNC: 0.55 MG/DL (ref 0.6–1.3)
DIFFERENTIAL METHOD BLD: ABNORMAL
EKG ATRIAL RATE: 76 BPM
EKG DIAGNOSIS: NORMAL
EKG P AXIS: 81 DEGREES
EKG P-R INTERVAL: 158 MS
EKG Q-T INTERVAL: 382 MS
EKG QRS DURATION: 96 MS
EKG QTC CALCULATION (BAZETT): 429 MS
EKG R AXIS: 77 DEGREES
EKG T AXIS: 52 DEGREES
EKG VENTRICULAR RATE: 76 BPM
EOSINOPHIL # BLD: 0.1 K/UL (ref 0–0.4)
EOSINOPHIL NFR BLD: 1 % (ref 0–5)
ERYTHROCYTE [DISTWIDTH] IN BLOOD BY AUTOMATED COUNT: 13 % (ref 11.6–14.5)
GLOBULIN SER CALC-MCNC: 3.2 G/DL (ref 2–4)
GLUCOSE SERPL-MCNC: 91 MG/DL (ref 74–99)
HCG SERPL QL: NEGATIVE
HCT VFR BLD AUTO: 39 % (ref 35–45)
HGB BLD-MCNC: 13 G/DL (ref 12–16)
IMM GRANULOCYTES # BLD AUTO: 0 K/UL (ref 0–0.04)
IMM GRANULOCYTES NFR BLD AUTO: 0 % (ref 0–0.5)
LYMPHOCYTES # BLD: 1.8 K/UL (ref 0.9–3.6)
LYMPHOCYTES NFR BLD: 35 % (ref 21–52)
MCH RBC QN AUTO: 31 PG (ref 24–34)
MCHC RBC AUTO-ENTMCNC: 33.3 G/DL (ref 31–37)
MCV RBC AUTO: 93.1 FL (ref 78–100)
MONOCYTES # BLD: 0.4 K/UL (ref 0.05–1.2)
MONOCYTES NFR BLD: 8 % (ref 3–10)
NEUTS SEG # BLD: 2.9 K/UL (ref 1.8–8)
NEUTS SEG NFR BLD: 55 % (ref 40–73)
NRBC # BLD: 0 K/UL (ref 0–0.01)
NRBC BLD-RTO: 0 PER 100 WBC
PLATELET # BLD AUTO: 240 K/UL (ref 135–420)
PMV BLD AUTO: 10.6 FL (ref 9.2–11.8)
POTASSIUM SERPL-SCNC: 4.4 MMOL/L (ref 3.5–5.5)
PROT SERPL-MCNC: 6.7 G/DL (ref 6.4–8.2)
RBC # BLD AUTO: 4.19 M/UL (ref 4.2–5.3)
SODIUM SERPL-SCNC: 142 MMOL/L (ref 136–145)
WBC # BLD AUTO: 5.2 K/UL (ref 4.6–13.2)

## 2023-09-29 PROCEDURE — 80053 COMPREHEN METABOLIC PANEL: CPT

## 2023-09-29 PROCEDURE — 84703 CHORIONIC GONADOTROPIN ASSAY: CPT

## 2023-09-29 PROCEDURE — 36415 COLL VENOUS BLD VENIPUNCTURE: CPT

## 2023-09-29 PROCEDURE — 85025 COMPLETE CBC W/AUTO DIFF WBC: CPT

## 2023-09-29 PROCEDURE — 93005 ELECTROCARDIOGRAM TRACING: CPT

## 2023-10-02 ENCOUNTER — OFFICE VISIT (OUTPATIENT)
Age: 48
End: 2023-10-02
Payer: COMMERCIAL

## 2023-10-02 VITALS
DIASTOLIC BLOOD PRESSURE: 70 MMHG | OXYGEN SATURATION: 99 % | WEIGHT: 174.9 LBS | BODY MASS INDEX: 30.99 KG/M2 | TEMPERATURE: 97.3 F | SYSTOLIC BLOOD PRESSURE: 116 MMHG | HEART RATE: 80 BPM | HEIGHT: 63 IN

## 2023-10-02 DIAGNOSIS — K80.20 GALLSTONES: Primary | ICD-10-CM

## 2023-10-02 DIAGNOSIS — K21.9 GASTROESOPHAGEAL REFLUX DISEASE WITHOUT ESOPHAGITIS: ICD-10-CM

## 2023-10-02 DIAGNOSIS — Z98.84 S/P GASTRIC BYPASS: ICD-10-CM

## 2023-10-02 DIAGNOSIS — E78.00 HIGH CHOLESTEROL: ICD-10-CM

## 2023-10-02 DIAGNOSIS — G47.33 OBSTRUCTIVE SLEEP APNEA SYNDROME: ICD-10-CM

## 2023-10-02 DIAGNOSIS — K90.9 INTESTINAL MALABSORPTION, UNSPECIFIED TYPE: ICD-10-CM

## 2023-10-02 DIAGNOSIS — M19.90 ARTHRITIS: ICD-10-CM

## 2023-10-02 DIAGNOSIS — I10 HYPERTENSION, UNSPECIFIED TYPE: ICD-10-CM

## 2023-10-02 PROCEDURE — 3074F SYST BP LT 130 MM HG: CPT | Performed by: SPECIALIST

## 2023-10-02 PROCEDURE — 3078F DIAST BP <80 MM HG: CPT | Performed by: SPECIALIST

## 2023-10-02 PROCEDURE — 99214 OFFICE O/P EST MOD 30 MIN: CPT | Performed by: SPECIALIST

## 2023-10-02 RX ORDER — ONDANSETRON 8 MG/1
8 TABLET, ORALLY DISINTEGRATING ORAL EVERY 8 HOURS PRN
Status: ON HOLD | COMMUNITY
Start: 2023-01-30 | End: 2023-10-05 | Stop reason: HOSPADM

## 2023-10-02 RX ORDER — FLUTICASONE PROPIONATE 50 MCG
1 SPRAY, SUSPENSION (ML) NASAL DAILY
Status: ON HOLD | COMMUNITY
End: 2023-10-05 | Stop reason: HOSPADM

## 2023-10-02 ASSESSMENT — PATIENT HEALTH QUESTIONNAIRE - PHQ9
1. LITTLE INTEREST OR PLEASURE IN DOING THINGS: 0
2. FEELING DOWN, DEPRESSED OR HOPELESS: 0
SUM OF ALL RESPONSES TO PHQ QUESTIONS 1-9: 0
SUM OF ALL RESPONSES TO PHQ QUESTIONS 1-9: 0
SUM OF ALL RESPONSES TO PHQ9 QUESTIONS 1 & 2: 0
SUM OF ALL RESPONSES TO PHQ QUESTIONS 1-9: 0
SUM OF ALL RESPONSES TO PHQ QUESTIONS 1-9: 0

## 2023-10-02 NOTE — H&P (VIEW-ONLY)
Surgery Consultation    Subjective:     Felix Villatoro is a prior gastric bypass   patient who underwent their weight loss surgical procedure procedure approximately 8 months ago. They now present with a history of abdominal pain. She complains of right upper quadrant pain, typically associated  with fatty foods. She has had nausea. The patient has not had jaundice, acholic stools or dark urine and has not had a history of pancreatitis or hepatitis. Findings of ultrasound of the abdomen: gallstones. Pt is self-referred.       Past Medical History:   Diagnosis Date    Anxiety and depression 2012    on meds    Arthritis 2021    OA-knees, back    DJD (degenerative joint disease), cervical 2021    no surgery or injections    Environmental and seasonal allergies 2014    on meds    Gall stones 2022    having surgery on 10/5/2023    GERD (gastroesophageal reflux disease) 2021    UGI with erosive gastritis, not on meds currently, was treated in the past    H/O gastric bypass 02/09/2023    Dr. Heavenly Adam    High cholesterol 2019    on meds    History of COVID-19 05/2022    milds sxs, not hospitalized    Hypertension 2017    H/O, currently not on meds    Kidney calculi 06/2022    Left-found on x-ray, no surgery    Obstructive sleep apnea syndrome 2020    mild, no device indicated    PONV (postoperative nausea and vomiting) 02/2023    with gastric bypass    Prediabetes 2022    not on meds, recent hga1c 5.0-on 9/12/23    Prolonged emergence from general anesthesia 2019    with hysterectomy    Wears prescription eyeglasses 1985      Past Surgical History:   Procedure Laterality Date    ANTERIOR CRUCIATE LIGAMENT REPAIR Left 02/2017    COLONOSCOPY  2021    ESOPHAGOGASTRODUODENOSCOPY  03/23/2021    with BX    GASTRIC BYPASS SURGERY  02/09/2023    Dr Jericho Dunlap (CERVIX STATUS UNKNOWN)  02/04/2019    vaginal (partial) with A&P Repair      Social History     Tobacco Use    Smoking status: Never     Passive bleeding, wound infection, trocar injuries and also the possible need for conversion to open procedure. She indicates that she understands the risks, accepts and wishes to proceed. Signed By: Niki Bosch MD    October 2, 2023       Total time spent with patient: 30 minutes.

## 2023-10-04 ENCOUNTER — ANESTHESIA EVENT (OUTPATIENT)
Facility: HOSPITAL | Age: 48
End: 2023-10-04
Payer: COMMERCIAL

## 2023-10-05 ENCOUNTER — APPOINTMENT (OUTPATIENT)
Facility: HOSPITAL | Age: 48
End: 2023-10-05
Attending: SPECIALIST
Payer: COMMERCIAL

## 2023-10-05 ENCOUNTER — ANESTHESIA (OUTPATIENT)
Facility: HOSPITAL | Age: 48
End: 2023-10-05
Payer: COMMERCIAL

## 2023-10-05 ENCOUNTER — HOSPITAL ENCOUNTER (OUTPATIENT)
Facility: HOSPITAL | Age: 48
Setting detail: OUTPATIENT SURGERY
Discharge: HOME | End: 2023-10-05
Attending: SPECIALIST | Admitting: SPECIALIST
Payer: COMMERCIAL

## 2023-10-05 VITALS
HEIGHT: 63 IN | RESPIRATION RATE: 16 BRPM | OXYGEN SATURATION: 100 % | SYSTOLIC BLOOD PRESSURE: 147 MMHG | BODY MASS INDEX: 31.33 KG/M2 | DIASTOLIC BLOOD PRESSURE: 93 MMHG | HEART RATE: 81 BPM | TEMPERATURE: 96.3 F | WEIGHT: 176.8 LBS

## 2023-10-05 DIAGNOSIS — G89.18 POST-OP PAIN: Primary | ICD-10-CM

## 2023-10-05 PROCEDURE — 2709999900 HC NON-CHARGEABLE SUPPLY: Performed by: SPECIALIST

## 2023-10-05 PROCEDURE — 3700000000 HC ANESTHESIA ATTENDED CARE: Performed by: SPECIALIST

## 2023-10-05 PROCEDURE — 2500000003 HC RX 250 WO HCPCS: Performed by: NURSE ANESTHETIST, CERTIFIED REGISTERED

## 2023-10-05 PROCEDURE — 6360000002 HC RX W HCPCS: Performed by: SPECIALIST

## 2023-10-05 PROCEDURE — 3700000001 HC ADD 15 MINUTES (ANESTHESIA): Performed by: SPECIALIST

## 2023-10-05 PROCEDURE — 88304 TISSUE EXAM BY PATHOLOGIST: CPT

## 2023-10-05 PROCEDURE — 7100000010 HC PHASE II RECOVERY - FIRST 15 MIN: Performed by: SPECIALIST

## 2023-10-05 PROCEDURE — 2720000010 HC SURG SUPPLY STERILE: Performed by: SPECIALIST

## 2023-10-05 PROCEDURE — 3600000015 HC SURGERY LEVEL 5 ADDTL 15MIN: Performed by: SPECIALIST

## 2023-10-05 PROCEDURE — 2580000003 HC RX 258: Performed by: NURSE ANESTHETIST, CERTIFIED REGISTERED

## 2023-10-05 PROCEDURE — 7100000001 HC PACU RECOVERY - ADDTL 15 MIN: Performed by: SPECIALIST

## 2023-10-05 PROCEDURE — 7100000000 HC PACU RECOVERY - FIRST 15 MIN: Performed by: SPECIALIST

## 2023-10-05 PROCEDURE — 2580000003 HC RX 258: Performed by: SPECIALIST

## 2023-10-05 PROCEDURE — 6370000000 HC RX 637 (ALT 250 FOR IP): Performed by: ANESTHESIOLOGY

## 2023-10-05 PROCEDURE — 6360000002 HC RX W HCPCS: Performed by: ANESTHESIOLOGY

## 2023-10-05 PROCEDURE — 3600000005 HC SURGERY LEVEL 5 BASE: Performed by: SPECIALIST

## 2023-10-05 PROCEDURE — 7100000011 HC PHASE II RECOVERY - ADDTL 15 MIN: Performed by: SPECIALIST

## 2023-10-05 PROCEDURE — 6360000002 HC RX W HCPCS: Performed by: NURSE ANESTHETIST, CERTIFIED REGISTERED

## 2023-10-05 RX ORDER — FENTANYL CITRATE 50 UG/ML
INJECTION, SOLUTION INTRAMUSCULAR; INTRAVENOUS PRN
Status: DISCONTINUED | OUTPATIENT
Start: 2023-10-05 | End: 2023-10-05 | Stop reason: SDUPTHER

## 2023-10-05 RX ORDER — CIPROFLOXACIN 2 MG/ML
400 INJECTION, SOLUTION INTRAVENOUS
Status: COMPLETED | OUTPATIENT
Start: 2023-10-05 | End: 2023-10-05

## 2023-10-05 RX ORDER — IOPAMIDOL 408 MG/ML
INJECTION, SOLUTION INTRATHECAL PRN
Status: DISCONTINUED | OUTPATIENT
Start: 2023-10-05 | End: 2023-10-05 | Stop reason: ALTCHOICE

## 2023-10-05 RX ORDER — ONDANSETRON 2 MG/ML
4 INJECTION INTRAMUSCULAR; INTRAVENOUS
Status: DISCONTINUED | OUTPATIENT
Start: 2023-10-05 | End: 2023-10-05 | Stop reason: HOSPADM

## 2023-10-05 RX ORDER — MIDAZOLAM HYDROCHLORIDE 1 MG/ML
INJECTION INTRAMUSCULAR; INTRAVENOUS PRN
Status: DISCONTINUED | OUTPATIENT
Start: 2023-10-05 | End: 2023-10-05 | Stop reason: SDUPTHER

## 2023-10-05 RX ORDER — SODIUM CHLORIDE 0.9 % (FLUSH) 0.9 %
5-40 SYRINGE (ML) INJECTION EVERY 12 HOURS SCHEDULED
Status: DISCONTINUED | OUTPATIENT
Start: 2023-10-05 | End: 2023-10-05 | Stop reason: HOSPADM

## 2023-10-05 RX ORDER — MEPERIDINE HYDROCHLORIDE 50 MG/ML
12.5 INJECTION INTRAMUSCULAR; INTRAVENOUS; SUBCUTANEOUS ONCE
Status: DISCONTINUED | OUTPATIENT
Start: 2023-10-05 | End: 2023-10-05 | Stop reason: HOSPADM

## 2023-10-05 RX ORDER — DROPERIDOL 2.5 MG/ML
0.62 INJECTION, SOLUTION INTRAMUSCULAR; INTRAVENOUS
Status: DISCONTINUED | OUTPATIENT
Start: 2023-10-05 | End: 2023-10-05 | Stop reason: HOSPADM

## 2023-10-05 RX ORDER — DIPHENHYDRAMINE HYDROCHLORIDE 50 MG/ML
12.5 INJECTION INTRAMUSCULAR; INTRAVENOUS
Status: DISCONTINUED | OUTPATIENT
Start: 2023-10-05 | End: 2023-10-05 | Stop reason: HOSPADM

## 2023-10-05 RX ORDER — FENTANYL CITRATE 50 UG/ML
25 INJECTION, SOLUTION INTRAMUSCULAR; INTRAVENOUS EVERY 5 MIN PRN
Status: DISCONTINUED | OUTPATIENT
Start: 2023-10-05 | End: 2023-10-05 | Stop reason: HOSPADM

## 2023-10-05 RX ORDER — PROPOFOL 10 MG/ML
INJECTION, EMULSION INTRAVENOUS PRN
Status: DISCONTINUED | OUTPATIENT
Start: 2023-10-05 | End: 2023-10-05 | Stop reason: SDUPTHER

## 2023-10-05 RX ORDER — OXYCODONE HYDROCHLORIDE 5 MG/1
5 TABLET ORAL PRN
Status: DISCONTINUED | OUTPATIENT
Start: 2023-10-05 | End: 2023-10-05 | Stop reason: HOSPADM

## 2023-10-05 RX ORDER — DEXMEDETOMIDINE HYDROCHLORIDE 100 UG/ML
INJECTION, SOLUTION INTRAVENOUS PRN
Status: DISCONTINUED | OUTPATIENT
Start: 2023-10-05 | End: 2023-10-05 | Stop reason: SDUPTHER

## 2023-10-05 RX ORDER — SODIUM CHLORIDE 0.9 % (FLUSH) 0.9 %
5-40 SYRINGE (ML) INJECTION PRN
Status: DISCONTINUED | OUTPATIENT
Start: 2023-10-05 | End: 2023-10-05 | Stop reason: HOSPADM

## 2023-10-05 RX ORDER — ACETAMINOPHEN 500 MG
1000 TABLET ORAL ONCE
Status: COMPLETED | OUTPATIENT
Start: 2023-10-05 | End: 2023-10-05

## 2023-10-05 RX ORDER — SODIUM CHLORIDE 9 MG/ML
INJECTION, SOLUTION INTRAVENOUS PRN
Status: DISCONTINUED | OUTPATIENT
Start: 2023-10-05 | End: 2023-10-05 | Stop reason: HOSPADM

## 2023-10-05 RX ORDER — OXYCODONE HYDROCHLORIDE 5 MG/1
10 TABLET ORAL PRN
Status: DISCONTINUED | OUTPATIENT
Start: 2023-10-05 | End: 2023-10-05 | Stop reason: HOSPADM

## 2023-10-05 RX ORDER — SODIUM CHLORIDE, SODIUM LACTATE, POTASSIUM CHLORIDE, CALCIUM CHLORIDE 600; 310; 30; 20 MG/100ML; MG/100ML; MG/100ML; MG/100ML
INJECTION, SOLUTION INTRAVENOUS CONTINUOUS
Status: DISCONTINUED | OUTPATIENT
Start: 2023-10-05 | End: 2023-10-05 | Stop reason: HOSPADM

## 2023-10-05 RX ORDER — BUPIVACAINE HYDROCHLORIDE 2.5 MG/ML
INJECTION, SOLUTION EPIDURAL; INFILTRATION; INTRACAUDAL PRN
Status: DISCONTINUED | OUTPATIENT
Start: 2023-10-05 | End: 2023-10-05 | Stop reason: ALTCHOICE

## 2023-10-05 RX ORDER — OXYCODONE HYDROCHLORIDE AND ACETAMINOPHEN 5; 325 MG/1; MG/1
1 TABLET ORAL EVERY 6 HOURS PRN
Qty: 28 TABLET | Refills: 0 | Status: SHIPPED | OUTPATIENT
Start: 2023-10-05 | End: 2023-10-12

## 2023-10-05 RX ORDER — ROCURONIUM BROMIDE 10 MG/ML
INJECTION, SOLUTION INTRAVENOUS PRN
Status: DISCONTINUED | OUTPATIENT
Start: 2023-10-05 | End: 2023-10-05 | Stop reason: SDUPTHER

## 2023-10-05 RX ORDER — LABETALOL HYDROCHLORIDE 5 MG/ML
10 INJECTION, SOLUTION INTRAVENOUS
Status: DISCONTINUED | OUTPATIENT
Start: 2023-10-05 | End: 2023-10-05 | Stop reason: HOSPADM

## 2023-10-05 RX ORDER — ONDANSETRON 2 MG/ML
INJECTION INTRAMUSCULAR; INTRAVENOUS PRN
Status: DISCONTINUED | OUTPATIENT
Start: 2023-10-05 | End: 2023-10-05 | Stop reason: SDUPTHER

## 2023-10-05 RX ORDER — EPHEDRINE SULFATE/0.9% NACL/PF 50 MG/5 ML
SYRINGE (ML) INTRAVENOUS PRN
Status: DISCONTINUED | OUTPATIENT
Start: 2023-10-05 | End: 2023-10-05 | Stop reason: SDUPTHER

## 2023-10-05 RX ORDER — LIDOCAINE HYDROCHLORIDE 20 MG/ML
INJECTION, SOLUTION EPIDURAL; INFILTRATION; INTRACAUDAL; PERINEURAL PRN
Status: DISCONTINUED | OUTPATIENT
Start: 2023-10-05 | End: 2023-10-05 | Stop reason: SDUPTHER

## 2023-10-05 RX ORDER — LORAZEPAM 2 MG/ML
0.5 INJECTION INTRAMUSCULAR ONCE
Status: DISCONTINUED | OUTPATIENT
Start: 2023-10-05 | End: 2023-10-05 | Stop reason: HOSPADM

## 2023-10-05 RX ORDER — HYDRALAZINE HYDROCHLORIDE 20 MG/ML
10 INJECTION INTRAMUSCULAR; INTRAVENOUS
Status: DISCONTINUED | OUTPATIENT
Start: 2023-10-05 | End: 2023-10-05 | Stop reason: HOSPADM

## 2023-10-05 RX ORDER — SODIUM CHLORIDE, SODIUM LACTATE, POTASSIUM CHLORIDE, CALCIUM CHLORIDE 600; 310; 30; 20 MG/100ML; MG/100ML; MG/100ML; MG/100ML
INJECTION, SOLUTION INTRAVENOUS CONTINUOUS PRN
Status: DISCONTINUED | OUTPATIENT
Start: 2023-10-05 | End: 2023-10-05 | Stop reason: SDUPTHER

## 2023-10-05 RX ORDER — HYDROMORPHONE HYDROCHLORIDE 1 MG/ML
0.5 INJECTION, SOLUTION INTRAMUSCULAR; INTRAVENOUS; SUBCUTANEOUS EVERY 5 MIN PRN
Status: DISCONTINUED | OUTPATIENT
Start: 2023-10-05 | End: 2023-10-05 | Stop reason: HOSPADM

## 2023-10-05 RX ADMIN — MIDAZOLAM 2 MG: 1 INJECTION INTRAMUSCULAR; INTRAVENOUS at 10:31

## 2023-10-05 RX ADMIN — PROPOFOL 180 MG: 10 INJECTION, EMULSION INTRAVENOUS at 10:40

## 2023-10-05 RX ADMIN — Medication 10 MG: at 10:48

## 2023-10-05 RX ADMIN — SODIUM CHLORIDE, SODIUM LACTATE, POTASSIUM CHLORIDE, AND CALCIUM CHLORIDE: 600; 310; 30; 20 INJECTION, SOLUTION INTRAVENOUS at 10:17

## 2023-10-05 RX ADMIN — ROCURONIUM BROMIDE 5 MG: 10 INJECTION, SOLUTION INTRAVENOUS at 10:39

## 2023-10-05 RX ADMIN — ACETAMINOPHEN 1000 MG: 500 TABLET ORAL at 09:19

## 2023-10-05 RX ADMIN — LIDOCAINE HYDROCHLORIDE 100 MG: 20 INJECTION, SOLUTION EPIDURAL; INFILTRATION; INTRACAUDAL; PERINEURAL at 10:40

## 2023-10-05 RX ADMIN — CIPROFLOXACIN 400 MG: 2 INJECTION, SOLUTION INTRAVENOUS at 10:42

## 2023-10-05 RX ADMIN — DEXMEDETOMIDINE HYDROCHLORIDE 10 MCG: 100 INJECTION, SOLUTION INTRAVENOUS at 10:31

## 2023-10-05 RX ADMIN — Medication 10 MG: at 10:52

## 2023-10-05 RX ADMIN — ONDANSETRON 4 MG: 2 INJECTION INTRAMUSCULAR; INTRAVENOUS at 10:49

## 2023-10-05 RX ADMIN — SODIUM CHLORIDE, POTASSIUM CHLORIDE, SODIUM LACTATE AND CALCIUM CHLORIDE: 600; 310; 30; 20 INJECTION, SOLUTION INTRAVENOUS at 09:19

## 2023-10-05 RX ADMIN — DEXMEDETOMIDINE HYDROCHLORIDE 10 MCG: 100 INJECTION, SOLUTION INTRAVENOUS at 10:33

## 2023-10-05 RX ADMIN — PROPOFOL 50 MG: 10 INJECTION, EMULSION INTRAVENOUS at 11:36

## 2023-10-05 RX ADMIN — FENTANYL CITRATE 50 MCG: 50 INJECTION, SOLUTION INTRAMUSCULAR; INTRAVENOUS at 10:32

## 2023-10-05 RX ADMIN — SUGAMMADEX 200 MG: 100 INJECTION, SOLUTION INTRAVENOUS at 11:32

## 2023-10-05 RX ADMIN — FENTANYL CITRATE 25 MCG: 50 INJECTION INTRAMUSCULAR; INTRAVENOUS at 12:45

## 2023-10-05 RX ADMIN — ROCURONIUM BROMIDE 45 MG: 10 INJECTION, SOLUTION INTRAVENOUS at 10:40

## 2023-10-05 ASSESSMENT — PAIN SCALES - GENERAL
PAINLEVEL_OUTOF10: 5
PAINLEVEL_OUTOF10: 4
PAINLEVEL_OUTOF10: 0
PAINLEVEL_OUTOF10: 3
PAINLEVEL_OUTOF10: 0
PAINLEVEL_OUTOF10: 5
PAINLEVEL_OUTOF10: 2

## 2023-10-05 ASSESSMENT — PAIN - FUNCTIONAL ASSESSMENT
PAIN_FUNCTIONAL_ASSESSMENT: ACTIVITIES ARE NOT PREVENTED
PAIN_FUNCTIONAL_ASSESSMENT: ACTIVITIES ARE NOT PREVENTED
PAIN_FUNCTIONAL_ASSESSMENT: 0-10

## 2023-10-05 ASSESSMENT — PAIN DESCRIPTION - DESCRIPTORS
DESCRIPTORS: ACHING

## 2023-10-05 ASSESSMENT — PAIN DESCRIPTION - LOCATION
LOCATION: ABDOMEN

## 2023-10-05 ASSESSMENT — PAIN DESCRIPTION - PAIN TYPE: TYPE: SURGICAL PAIN

## 2023-10-05 NOTE — PERIOP NOTE
TRANSFER - IN REPORT:    Verbal report received from [place], [employer] RN  on Clem Clark  being received from OR  for routine progression of patient care      Report consisted of patient's Situation, Background, Assessment and   Recommendations(SBAR). Information from the following report(s) Adult Overview, Surgery Report, Intake/Output, and MAR was reviewed with the receiving nurse. Opportunity for questions and clarification was provided. Assessment completed upon patient's arrival to unit and care assumed.

## 2023-10-05 NOTE — PERIOP NOTE
Reviewed PTA medication list with patient/caregiver and patient/caregiver denies any additional medications. Patient admits to having a responsible adult care for them at home for at least 24 hours after surgery. Patient encouraged to use gown warming system and informed that using said warming gown to regulate body temperature prior to a procedure has been shown to help reduce the risks of blood clots and infection. Patient's pharmacy of choice verified and documented in PTA medication section. Dual skin assessment & fall risk band verification completed with Bessie LEIVA.

## 2023-10-05 NOTE — BRIEF OP NOTE
Brief Postoperative Note      Patient: All Sprague  YOB: 1975  MRN: [id number]    Date of Procedure: 10/5/2023    Pre-Op Diagnosis Codes:     * Calculus of gallbladder with cholecystitis without biliary obstruction, unspecified cholecystitis acuity [K80.10]     * Other cholelithiasis without obstruction [K80.80]     * Bariatric surgery status [Z98.84]    Post-Op Diagnosis: Same       Procedure(s):  1.  LAPAROSCOPIC  CHOLECYSTECTOMY    Surgeon(s):  Hernando Christie MD    Assistant:  Surgical Assistant: Bandar Apple  Physician Assistant: EFREN Peñaloza    Anesthesia: General    Estimated Blood Loss (mL): Minimal    Complications: None    Specimens:   ID Type Source Tests Collected by Time Destination   A : GALLBLADDER AND CONTENTS Tissue Gallbladder SURGICAL PATHOLOGY Hernando Christie MD 10/5/2023 4947        Implants:  * No implants in log *      Drains: * No LDAs found *    Findings: subacute cholecystitis--#[id number]      Electronically signed by Hernando Christie MD on 10/5/2023 at 11:41 AM

## 2023-10-05 NOTE — PERIOP NOTE
TRANSFER - OUT REPORT:    Verbal report given to Colin Draper RN  on Gaylia Showers  being transferred to Phase iI  for routine progression of patient care       Report consisted of patient's Situation, Background, Assessment and   Recommendations(SBAR). Information from the following report(s) Adult Overview, Surgery Report, Intake/Output, and MAR was reviewed with the receiving nurse. Lines:   Peripheral IV 10/05/23 Right Forearm (Active)   Site Assessment Clean, dry & intact 10/05/23 1313   Line Status Infusing 10/05/23 [facility] [address] checked and tightened 10/05/23 0918   Phlebitis Assessment No symptoms 10/05/23 1313   Infiltration Assessment 0 10/05/23 1313   Alcohol Cap Used Yes 10/05/23 0918   Dressing Status Clean, dry & intact 10/05/23 1313   Dressing Type Transparent 10/05/23 1313   Dressing Intervention New 10/05/23 0918        Opportunity for questions and clarification was provided.       Patient transported with:  Registered Nurse

## 2023-10-05 NOTE — ANESTHESIA PRE PROCEDURE
Department of Anesthesiology  Preprocedure Note       Name:  Amelia Madsen   Age:  53 y.o.  :  1975                                          MRN:  [id number]         Date:  10/5/2023      Surgeon: Esha Mills):  Joyce Pereira MD    Procedure: Procedure(s):  LAPAROSCOPIC  CHOLECYSTECTOMY WITH INTRAOPERATIVE CHOLANGIOGRAM WITH C-ARM    Medications prior to admission:   Prior to Admission medications    Medication Sig Start Date End Date Taking?  Authorizing Provider   fluticasone (FLONASE) 50 MCG/ACT nasal spray 1 spray by Nasal route daily    Pepe Carter MD   ondansetron (ZOFRAN-ODT) 8 MG TBDP disintegrating tablet Take 1 tablet by mouth every 8 hours as needed  Patient not taking: Reported on 10/5/2023 1/30/23   Pepe Carter MD   rosuvastatin (CRESTOR) 10 MG tablet Take 1 tablet by mouth nightly Indications: high cholestrol    Pepe Carter MD   azelastine (ASTELIN) 0.1 % nasal spray 2 sprays by Nasal route nightly Use in each nostril as directed    Pepe Carter MD   venlafaxine (EFFEXOR XR) 150 MG extended release capsule Take 1 capsule by mouth every morning Indications: anxiety/depression    Pepe Carter MD   venlafaxine (EFFEXOR) 75 MG tablet Take 1 tablet by mouth every morning Indications: anxiety and depression    Pepe Carter MD   calcium carbonate (OSCAL) 500 MG TABS tablet Take 1 tablet by mouth daily    Pepe Carter MD   vitamin B-12 (CYANOCOBALAMIN) 500 MCG tablet Take 1 tablet by mouth daily    Pepe Carter MD   b complex vitamins capsule Take 1 capsule by mouth daily    Pepe Carter MD   Cholecalciferol (VITAMIN D) 50 MCG ( UT) CAPS capsule Take by mouth    Pepe Carter MD   ARIPiprazole (ABILIFY) 2 MG tablet Take 1 tablet by mouth every morning Indications: anxiety/depression    Pepe Carter MD   cetirizine (ZYRTEC) 5 MG tablet Take 1 tablet by mouth at bedtime Indications: allergies

## 2023-10-05 NOTE — INTERVAL H&P NOTE
Update History & Physical    The patient's History and Physical of October 2, 2023 was reviewed with the patient and I examined the patient. There was no change. The surgical site was confirmed by the patient and me. Plan: The risks, benefits, expected outcome, and alternative to the recommended procedure have been discussed with the patient. Patient understands and wants to proceed with the procedure.      Electronically signed by Ngozi Antony MD on 10/5/2023 at 10:27 AM

## 2023-10-05 NOTE — DISCHARGE INSTRUCTIONS
beverages  If you have not urinated within 8 hours after discharge, please contact your surgeon on call. Report the following to your surgeon:  Excessive pain, swelling, redness or odor of or around the surgical area  Temperature over 100.5  Nausea and vomiting lasting longer than 4 hours or if unable to take medications  Any signs of decreased circulation or nerve impairment to extremity: change in color, persistent  numbness, tingling, coldness or increase pain  Any questions    What to do at Home:  Recommended activity: ,   LIGHT DIET TODAY ADVANCE AS TOLERATED  OK TO SHOWER IN 48 HRS DO NOT SCRUB INCISION SITES  RETURN TO OFFICE AS SCHEDULED    If you experience any of the following symptoms heavy bleeding, fevers, severe pain, please follow up with dr Macario Briggs. *  Please give a list of your current medications to your Primary Care Provider. *  Please update this list whenever your medications are discontinued, doses are      changed, or new medications (including over-the-counter products) are added. *  Please carry medication information at all times in case of emergency situations. These are general instructions for a healthy lifestyle:    No smoking/ No tobacco products/ Avoid exposure to second hand smoke  Surgeon General's Warning:  Quitting smoking now greatly reduces serious risk to your health. Obesity, smoking, and sedentary lifestyle greatly increases your risk for illness    A healthy diet, regular physical exercise & weight monitoring are important for maintaining a healthy lifestyle    You may be retaining fluid if you have a history of heart failure or if you experience any of the following symptoms:  Weight gain of 3 pounds or more overnight or 5 pounds in a week, increased swelling in our hands or feet or shortness of breath while lying flat in bed. Please call your doctor as soon as you notice any of these symptoms; do not wait until your next office visit.         The discharge information has been reviewed with the patient and caregiver. The patient and caregiver verbalized understanding. Discharge medications reviewed with the patient and caregiver and appropriate educational materials and side effects teaching were provided.   ___________________________________________________________________________________________________________________________________    Patient armband removed and shredded

## 2023-10-05 NOTE — PERIOP NOTE
TRANSFER - IN REPORT:    Verbal report received from Seth on Felix Riser  being received from [facility] for routine post-op      Report consisted of patient's Situation, Background, Assessment and   Recommendations(SBAR). Information from the following report(s) Nurse Handoff Report was reviewed with the receiving nurse. Opportunity for questions and clarification was provided. Assessment completed upon patient's arrival to unit and care assumed.

## 2023-10-06 NOTE — OP NOTE
[facility]  OPERATIVE REPORT    Name:  Catherine Mathews  MR#:   [id number]  :  1975  ACCOUNT #:  [de-identified]  DATE OF SERVICE:  10/05/2023    PREOPERATIVE DIAGNOSIS:  Symptomatic cholelithiasis. POSTOPERATIVE DIAGNOSIS:  Subacute cholecystitis    PROCEDURE PERFORMED:  Laparoscopic cholecystectomy with attempt at cholangiography. SURGEON:  Eleazar Brown MD    ASSISTANT:  EFREN Klein.  PA, Charlie Franco, assisted with the procedure since there were no qualified surgeons, interns, or residents available. He assisted with dissection which was quite difficult, exposure during procedure, removal of gallbladder, attempted cholangiography, and closure of skin and fascial incisions. ANESTHESIA:  General endotracheal.    COMPLICATIONS:  None. SPECIMENS REMOVED:  Gallbladder specimen. IMPLANTS:  None. ESTIMATED BLOOD LOSS:  Minimal.    INDICATIONS:  The patient is a 79-year-old female patient of mine, who had a gastric bypass procedure in the past.  She has had known gallstones for quite sometime and has become quite symptomatic from her cholelithiasis. At this time period, she has agreed to proceed with cholecystectomy understanding the benefits of the operation and the risks involved, and does wish to proceed. PROCEDURE:  The patient was brought to operating room, placed on the table in supine position. At which time, general anesthesia was administered without any difficulty. Her abdomen was then prepped and draped in the usual sterile fashion. Using 15-blade, a 1-cm incision made inferior to the umbilicus. Veress needle approach was used to gain access to peritoneal cavity which was then insufflated. The Marlea Kussmaul was then placed at that site. Then, three additional trocars were placed in right subcostal fashion. On entering the abdomen, the patient noted to have subacute inflammation present.   I retracted the gallbladder liver margin at the infundibular region of the incisions were then closed using 4-0 subcuticular Monocryl. Steri-Strips and sterile dressings were applied. The patient tolerated the procedure well.       Joyce Pereira MD      [id number]  D:  10/06/2023 7:52  T:  10/06/2023 10:29  JOB #:  [id number]

## 2023-10-09 ENCOUNTER — TELEPHONE (OUTPATIENT)
Age: 48
End: 2023-10-09

## 2023-10-09 NOTE — TELEPHONE ENCOUNTER
This RN spoke with patient post-operatively. Nausea and/or vomitting: None    Pain: Currently managed with Tylenol    Lap sites: No erythema, drainage, and/or swelling    BM: Once or twice daily    Ambulation: Patient is walking throughout house every hour. IS: Patient continues to use 10x's per hour while awake. She has reached 2,250 mL. Temperature: Patient is not monitoring. RN educated her on the importance of doing so daily, and to contact the office with a temperature of 100.4 degrees and/or above. She verbalized understanding. Questions: Patient requested a return to work note. RN completed and emailed it to patient. This RN reminded the patient to contact the office with any questions and/or concerns. Patient verbalized understanding. Patient's two week post-op visit is scheduled and was confirmed.

## 2023-10-19 ENCOUNTER — OFFICE VISIT (OUTPATIENT)
Age: 48
End: 2023-10-19

## 2023-10-19 VITALS
HEIGHT: 63 IN | SYSTOLIC BLOOD PRESSURE: 106 MMHG | OXYGEN SATURATION: 100 % | TEMPERATURE: 98.1 F | HEART RATE: 76 BPM | BODY MASS INDEX: 30.78 KG/M2 | DIASTOLIC BLOOD PRESSURE: 68 MMHG | WEIGHT: 173.7 LBS

## 2023-10-19 DIAGNOSIS — Z09 POSTOPERATIVE EXAMINATION: Primary | ICD-10-CM

## 2023-10-19 PROCEDURE — 99024 POSTOP FOLLOW-UP VISIT: CPT | Performed by: NURSE PRACTITIONER

## 2023-10-19 RX ORDER — MULTIVIT-MIN/IRON/FOLIC ACID/K 18-600-40
CAPSULE ORAL
COMMUNITY

## 2023-10-19 RX ORDER — VITAMIN B COMPLEX
1 CAPSULE ORAL DAILY
COMMUNITY

## 2023-10-19 RX ORDER — FLUTICASONE PROPIONATE 50 MCG
1 SPRAY, SUSPENSION (ML) NASAL DAILY
COMMUNITY

## 2023-10-19 RX ORDER — ROSUVASTATIN CALCIUM 10 MG/1
10 TABLET, COATED ORAL DAILY
COMMUNITY

## 2023-10-19 RX ORDER — CALCIUM CARBONATE 500(1250)
500 TABLET ORAL DAILY
COMMUNITY

## 2023-10-19 RX ORDER — CLONAZEPAM 1 MG/1
1 TABLET ORAL 2 TIMES DAILY PRN
COMMUNITY

## 2023-10-19 RX ORDER — ARIPIPRAZOLE 5 MG/1
5 TABLET ORAL DAILY
COMMUNITY

## 2023-10-19 RX ORDER — CYANOCOBALAMIN/FOLIC ACID 1MG-400MCG
TABLET, SUBLINGUAL SUBLINGUAL
COMMUNITY

## 2023-10-19 RX ORDER — M-VIT,TX,IRON,MINS/CALC/FOLIC 27MG-0.4MG
1 TABLET ORAL DAILY
COMMUNITY

## 2023-10-19 RX ORDER — VENLAFAXINE HYDROCHLORIDE 150 MG/1
150 TABLET, EXTENDED RELEASE ORAL
COMMUNITY

## 2024-02-12 ENCOUNTER — OFFICE VISIT (OUTPATIENT)
Age: 49
End: 2024-02-12
Payer: COMMERCIAL

## 2024-02-12 VITALS
DIASTOLIC BLOOD PRESSURE: 76 MMHG | HEIGHT: 63 IN | TEMPERATURE: 97.1 F | SYSTOLIC BLOOD PRESSURE: 123 MMHG | HEART RATE: 80 BPM | OXYGEN SATURATION: 100 % | WEIGHT: 165.7 LBS | BODY MASS INDEX: 29.36 KG/M2

## 2024-02-12 DIAGNOSIS — K90.9 INTESTINAL MALABSORPTION, UNSPECIFIED TYPE: Primary | ICD-10-CM

## 2024-02-12 DIAGNOSIS — Z98.84 S/P GASTRIC BYPASS: ICD-10-CM

## 2024-02-12 PROCEDURE — 99214 OFFICE O/P EST MOD 30 MIN: CPT | Performed by: NURSE PRACTITIONER

## 2024-02-12 PROCEDURE — 3074F SYST BP LT 130 MM HG: CPT | Performed by: NURSE PRACTITIONER

## 2024-02-12 PROCEDURE — 3078F DIAST BP <80 MM HG: CPT | Performed by: NURSE PRACTITIONER

## 2024-02-12 NOTE — PROGRESS NOTES
Subjective:   Aide Grace  is a 48 y.o. female who presents for follow-up about 1 year following laparoscopic gastric bypass surgery.   Surgery related complication: NA.    She has lost a total of 74 pounds since surgery.  Body mass index is 29.35 kg/m²..  Loss of EBW is 67%.      The patient presents today to assess their progress toward their weight loss goal & to address any issues that may be present:   She is tolerating solid foods without difficulty, reports no real issues other than constipation and denies vomiting, abdominal pain, difficulty swallowing, nausea and reflux.  Fluid intake: 70 oz                              Protein intake:  2 shakes + food; chicken, yogurt  Eating 2 times daily.  The patient is taking recommended vitamins.     The patient's exercise level: not active.      Changes in her medical history and medications have been reviewed.      Comorbidities:    Hypertension: resolved  Diabetes: N/A  Obstructive Sleep Apnea: resolved  Hyperlipidemia: improved, off statin  Stress Urinary Incontinence: N/A  Gastroesophageal Reflux:resolved  Weight related arthropathy:improved    Patient Active Problem List   Diagnosis    High cholesterol    Arthritis    Gallstones    GERD (gastroesophageal reflux disease)    Anxiety    Hypertension    Kidney stones    Obstructive sleep apnea syndrome    DJD (degenerative joint disease), cervical    Intestinal malabsorption    S/P gastric bypass     Past Medical History:   Diagnosis Date    Anxiety and depression 2012    on meds    Arthritis 2021    OA-knees, back    DJD (degenerative joint disease), cervical 2021    no surgery or injections    Environmental and seasonal allergies 2014    on meds    Gall stones 2022    having surgery on 10/5/2023    GERD (gastroesophageal reflux disease) 2021    UGI with erosive gastritis, not on meds currently, was treated in the past    H/O gastric bypass 02/09/2023    Dr. Huizar    High cholesterol 2019    on meds

## 2024-02-12 NOTE — PATIENT INSTRUCTIONS
[employer] or [employer]  80-90g protein  800-1000 calories   Keep carbs below 50g      You need to get more aggressive with your bowel regimen. Constipation is very common for several reasons including pain medications, protein shakes, decreased intake, etc.    The medications for constipation on this list are available over-the-counter at most drug or grocery stores.     GET THINGS MOVING   TAKE 1 capful or packet of Miralax (polyethylene glycol) mixed with at least 8 ounces of water or diet juice 2 times daily, AND / OR   TAKE 1 tablet of Senna-S (sennosides / docusate) 2 times daily.   Once you are regular, you may adjust as needed (for example, stop Senna-S and continue Miralax).   If you don't have a BM for a total of 3 days, move to Step 2.     2. KEEP THINGS MOVING   INCREASE Senna-S to 2 tablets 2 times daily, AND CONTINUE Miralax, taking 1 capful or packet mixed with at least 8 ounces of water or diet juice 2 times daily   Once you are regular, you may adjust as needed.   If you don't have a BM for a total of of 5 days, begin Step 3.     3. REALLY GET THINGS MOVING   ADD 1 dose (30 ml), of Milk of Magnesia (magnesium hydroxide).   If you are able to have a BM return to Step 2 until you are done using opioids or you have constipation or diarrhea. If you don't have a BM within 8 hours,     ADD 1 tablet (10 mg) of Dulcolax (bisacodyl) OR 1 rectal suppository.   If you are able to have a bowel movement return to Step 2.   If you don't have a BM,     TAKE another dose of Milk of Magnesia and 1 tablet of Dulcolax.   If you are able to have a BM return to Step 2.   If you don't have a BM or have continued symptoms, move to Step 4.     4. REALLY, REALLY GET THINGS MOVING   TAKE ½ to 1 bottle of magnesium citrate   Once you finally have a BM, return to Step 2   If you don't have a bowel movement while you are using opioids or symptoms of constipation continue, call the office.       __ can mix 4 oz diet

## 2024-05-14 ENCOUNTER — HOSPITAL ENCOUNTER (OUTPATIENT)
Facility: HOSPITAL | Age: 49
Discharge: HOME | End: 2024-05-17
Payer: COMMERCIAL

## 2024-05-14 ENCOUNTER — OFFICE VISIT (OUTPATIENT)
Age: 49
End: 2024-05-14
Payer: COMMERCIAL

## 2024-05-14 VITALS
SYSTOLIC BLOOD PRESSURE: 116 MMHG | TEMPERATURE: 97.3 F | BODY MASS INDEX: 29.22 KG/M2 | OXYGEN SATURATION: 100 % | HEIGHT: 63 IN | WEIGHT: 164.9 LBS | DIASTOLIC BLOOD PRESSURE: 66 MMHG | HEART RATE: 86 BPM

## 2024-05-14 DIAGNOSIS — R10.12 ACUTE LUQ PAIN: ICD-10-CM

## 2024-05-14 DIAGNOSIS — Z98.84 S/P GASTRIC BYPASS: ICD-10-CM

## 2024-05-14 DIAGNOSIS — K90.9 INTESTINAL MALABSORPTION, UNSPECIFIED TYPE: Primary | ICD-10-CM

## 2024-05-14 PROCEDURE — 74177 CT ABD & PELVIS W/CONTRAST: CPT

## 2024-05-14 PROCEDURE — 6360000004 HC RX CONTRAST MEDICATION: Performed by: NURSE PRACTITIONER

## 2024-05-14 PROCEDURE — 3078F DIAST BP <80 MM HG: CPT | Performed by: NURSE PRACTITIONER

## 2024-05-14 PROCEDURE — 99214 OFFICE O/P EST MOD 30 MIN: CPT | Performed by: NURSE PRACTITIONER

## 2024-05-14 PROCEDURE — 3074F SYST BP LT 130 MM HG: CPT | Performed by: NURSE PRACTITIONER

## 2024-05-14 RX ADMIN — IOPAMIDOL 100 ML: 612 INJECTION, SOLUTION INTRAVENOUS at 17:30

## 2024-05-14 NOTE — PATIENT INSTRUCTIONS
adult complete multivitamin one month after surgery. Menstruating women can take a prenatal vitamin.    Make sure has at least 18 mg iron and 400-800 mcg folic acid   Vitamin B12, B Complex Vitamin, and Biotin  Start taking within a month after surgery.   Vitamin B12:  1000 mcg of Vitamin B12 three times weekly    Must take sublingually (meaning you take it under your tongue) or in a liquid drop form for easy absorption.      B Complex Vitamin: Take a pill or liquid drop form once daily.     Biotin: This vitamin can help prevent hair loss.    Recommend 5mg   (5000 mcg) a day  Biotin is Optional

## 2024-05-14 NOTE — PROGRESS NOTES
Subjective:     Aide Grace  is a 48 y.o. female who presents for follow-up about 15 months following laparoscopic gastric bypass. She has lost a total of 75 pounds since surgery.  Body mass index is 29.21 kg/m².. EBWL is (68%). The patient presents today to assess their progress toward their goal of weight loss and to address any issues that may be present.  Today the patient and I have reviewed their diet and how appropriate their food choices are.  The following issues have been identified - acute onset LUQ pain.    Surgery related complication: NA       She reports 4 days of acute onset LUQ pain with tenderness to touch that worsens with certain positions or movements. Does feel more bloated. She denies vomiting, difficulty swallowing, nausea and reflux.    Had gallbladder removed October 2023    Denies smoking, steroids, NSAIDs.    The patients diet choices have been reviewed today and counseling was given.      Fluid intake: 70 oz      Protein intake: eating less - chicken, yogurt      Meals/day: 5-6    Patients pain score: 0/10 currently, 4-5/10 with palpation      Changes in her medical history and medications have been reviewed.    Comorbidities:    Hypertension: resolved  Diabetes: N/A  Obstructive Sleep Apnea: resolved  Hyperlipidemia: improved, off statin  Stress Urinary Incontinence: N/A  Gastroesophageal Reflux:resolved  Weight related arthropathy:improved    Patient Active Problem List   Diagnosis    High cholesterol    Arthritis    Gallstones    GERD (gastroesophageal reflux disease)    Anxiety    Hypertension    Kidney stones    Obstructive sleep apnea syndrome    DJD (degenerative joint disease), cervical    Intestinal malabsorption    S/P gastric bypass     Past Medical History:   Diagnosis Date    Anxiety and depression 2012    on meds    Arthritis 2021    OA-knees, back    DJD (degenerative joint disease), cervical 2021    no surgery or injections    Environmental and seasonal allergies

## 2024-05-15 ENCOUNTER — HOSPITAL ENCOUNTER (OUTPATIENT)
Facility: HOSPITAL | Age: 49
Discharge: HOME | End: 2024-05-18
Payer: COMMERCIAL

## 2024-05-15 ENCOUNTER — TELEPHONE (OUTPATIENT)
Age: 49
End: 2024-05-15

## 2024-05-15 DIAGNOSIS — Z01.812 PRE-OPERATIVE LABORATORY EXAMINATION: ICD-10-CM

## 2024-05-15 DIAGNOSIS — Z98.84 S/P GASTRIC BYPASS: ICD-10-CM

## 2024-05-15 DIAGNOSIS — R10.12 ACUTE LUQ PAIN: Primary | ICD-10-CM

## 2024-05-15 DIAGNOSIS — R10.12 ACUTE LUQ PAIN: ICD-10-CM

## 2024-05-15 LAB
ALBUMIN SERPL-MCNC: 3.8 G/DL (ref 3.4–5)
ALBUMIN/GLOB SERPL: 1.2 (ref 0.8–1.7)
ALP SERPL-CCNC: 88 U/L (ref 45–117)
ALT SERPL-CCNC: 56 U/L (ref 13–56)
ANION GAP SERPL CALC-SCNC: 4 MMOL/L (ref 3–18)
AST SERPL-CCNC: 32 U/L (ref 10–38)
BASOPHILS # BLD: 0 K/UL (ref 0–0.1)
BASOPHILS NFR BLD: 0 % (ref 0–2)
BILIRUB SERPL-MCNC: 0.3 MG/DL (ref 0.2–1)
BUN SERPL-MCNC: 24 MG/DL (ref 7–18)
BUN/CREAT SERPL: 41 (ref 12–20)
CALCIUM SERPL-MCNC: 9.3 MG/DL (ref 8.5–10.1)
CHLORIDE SERPL-SCNC: 106 MMOL/L (ref 100–111)
CO2 SERPL-SCNC: 26 MMOL/L (ref 21–32)
CREAT SERPL-MCNC: 0.59 MG/DL (ref 0.6–1.3)
DIFFERENTIAL METHOD BLD: ABNORMAL
EOSINOPHIL # BLD: 0.1 K/UL (ref 0–0.4)
EOSINOPHIL NFR BLD: 1 % (ref 0–5)
ERYTHROCYTE [DISTWIDTH] IN BLOOD BY AUTOMATED COUNT: 12.4 % (ref 11.6–14.5)
GLOBULIN SER CALC-MCNC: 3.2 G/DL (ref 2–4)
GLUCOSE SERPL-MCNC: 169 MG/DL (ref 74–99)
HCT VFR BLD AUTO: 38.3 % (ref 35–45)
HGB BLD-MCNC: 12.7 G/DL (ref 12–16)
IMM GRANULOCYTES # BLD AUTO: 0 K/UL (ref 0–0.04)
IMM GRANULOCYTES NFR BLD AUTO: 0 % (ref 0–0.5)
LYMPHOCYTES # BLD: 2.4 K/UL (ref 0.9–3.6)
LYMPHOCYTES NFR BLD: 26 % (ref 21–52)
MCH RBC QN AUTO: 31.3 PG (ref 24–34)
MCHC RBC AUTO-ENTMCNC: 33.2 G/DL (ref 31–37)
MCV RBC AUTO: 94.3 FL (ref 78–100)
MONOCYTES # BLD: 0.5 K/UL (ref 0.05–1.2)
MONOCYTES NFR BLD: 6 % (ref 3–10)
NEUTS SEG # BLD: 6.3 K/UL (ref 1.8–8)
NEUTS SEG NFR BLD: 67 % (ref 40–73)
NRBC # BLD: 0 K/UL (ref 0–0.01)
NRBC BLD-RTO: 0 PER 100 WBC
PLATELET # BLD AUTO: 235 K/UL (ref 135–420)
PMV BLD AUTO: 11.1 FL (ref 9.2–11.8)
POTASSIUM SERPL-SCNC: 4.3 MMOL/L (ref 3.5–5.5)
PROT SERPL-MCNC: 7 G/DL (ref 6.4–8.2)
RBC # BLD AUTO: 4.06 M/UL (ref 4.2–5.3)
SODIUM SERPL-SCNC: 136 MMOL/L (ref 136–145)
WBC # BLD AUTO: 9.4 K/UL (ref 4.6–13.2)

## 2024-05-15 PROCEDURE — 36415 COLL VENOUS BLD VENIPUNCTURE: CPT

## 2024-05-15 PROCEDURE — 80053 COMPREHEN METABOLIC PANEL: CPT

## 2024-05-15 PROCEDURE — 85025 COMPLETE CBC W/AUTO DIFF WBC: CPT

## 2024-05-15 RX ORDER — SUCRALFATE ORAL 1 G/10ML
1 SUSPENSION ORAL
Qty: 414 ML | Refills: 1 | Status: SHIPPED | OUTPATIENT
Start: 2024-05-15

## 2024-05-15 RX ORDER — PANTOPRAZOLE SODIUM 40 MG/1
40 TABLET, DELAYED RELEASE ORAL
Qty: 60 TABLET | Refills: 5 | Status: SHIPPED | OUTPATIENT
Start: 2024-05-15

## 2024-05-15 NOTE — TELEPHONE ENCOUNTER
Spoke with patient about CT results. Has been drinking 3 cups of coffee daily.  Sent carafate suspension QID and Protonix BID  Schedule endoscopy

## 2024-05-21 ENCOUNTER — ANESTHESIA EVENT (OUTPATIENT)
Facility: HOSPITAL | Age: 49
End: 2024-05-21
Payer: COMMERCIAL

## 2024-05-21 ENCOUNTER — HOSPITAL ENCOUNTER (OUTPATIENT)
Facility: HOSPITAL | Age: 49
Setting detail: OUTPATIENT SURGERY
Discharge: HOME | End: 2024-05-21
Attending: SPECIALIST | Admitting: SPECIALIST
Payer: COMMERCIAL

## 2024-05-21 ENCOUNTER — ANESTHESIA (OUTPATIENT)
Facility: HOSPITAL | Age: 49
End: 2024-05-21
Payer: COMMERCIAL

## 2024-05-21 VITALS
HEART RATE: 83 BPM | SYSTOLIC BLOOD PRESSURE: 131 MMHG | RESPIRATION RATE: 16 BRPM | HEIGHT: 63 IN | OXYGEN SATURATION: 100 % | DIASTOLIC BLOOD PRESSURE: 76 MMHG | TEMPERATURE: 97.6 F | BODY MASS INDEX: 29.23 KG/M2 | WEIGHT: 165 LBS

## 2024-05-21 DIAGNOSIS — G89.18 POST-OP PAIN: Primary | ICD-10-CM

## 2024-05-21 PROBLEM — K66.0 ABDOMINAL ADHESIONS: Status: ACTIVE | Noted: 2024-05-21

## 2024-05-21 PROBLEM — K56.600 PARTIAL SMALL BOWEL OBSTRUCTION (HCC): Status: ACTIVE | Noted: 2024-05-21

## 2024-05-21 PROBLEM — K28.9 MARGINAL ULCER: Status: ACTIVE | Noted: 2024-05-21

## 2024-05-21 LAB — GLUCOSE BLD STRIP.AUTO-MCNC: 89 MG/DL (ref 70–110)

## 2024-05-21 PROCEDURE — 7100000001 HC PACU RECOVERY - ADDTL 15 MIN: Performed by: SPECIALIST

## 2024-05-21 PROCEDURE — 82962 GLUCOSE BLOOD TEST: CPT

## 2024-05-21 PROCEDURE — 3700000001 HC ADD 15 MINUTES (ANESTHESIA): Performed by: SPECIALIST

## 2024-05-21 PROCEDURE — 3600000005 HC SURGERY LEVEL 5 BASE: Performed by: SPECIALIST

## 2024-05-21 PROCEDURE — 6370000000 HC RX 637 (ALT 250 FOR IP): Performed by: ANESTHESIOLOGY

## 2024-05-21 PROCEDURE — 43239 EGD BIOPSY SINGLE/MULTIPLE: CPT | Performed by: SPECIALIST

## 2024-05-21 PROCEDURE — 3700000000 HC ANESTHESIA ATTENDED CARE: Performed by: SPECIALIST

## 2024-05-21 PROCEDURE — 2580000003 HC RX 258: Performed by: SPECIALIST

## 2024-05-21 PROCEDURE — 7100000010 HC PHASE II RECOVERY - FIRST 15 MIN: Performed by: SPECIALIST

## 2024-05-21 PROCEDURE — 2709999900 HC NON-CHARGEABLE SUPPLY: Performed by: SPECIALIST

## 2024-05-21 PROCEDURE — 44180 LAP ENTEROLYSIS: CPT | Performed by: SPECIALIST

## 2024-05-21 PROCEDURE — 6360000002 HC RX W HCPCS: Performed by: NURSE ANESTHETIST, CERTIFIED REGISTERED

## 2024-05-21 PROCEDURE — 6360000002 HC RX W HCPCS: Performed by: SPECIALIST

## 2024-05-21 PROCEDURE — 88305 TISSUE EXAM BY PATHOLOGIST: CPT

## 2024-05-21 PROCEDURE — 3600000015 HC SURGERY LEVEL 5 ADDTL 15MIN: Performed by: SPECIALIST

## 2024-05-21 PROCEDURE — 7100000000 HC PACU RECOVERY - FIRST 15 MIN: Performed by: SPECIALIST

## 2024-05-21 PROCEDURE — 7100000011 HC PHASE II RECOVERY - ADDTL 15 MIN: Performed by: SPECIALIST

## 2024-05-21 PROCEDURE — 2720000010 HC SURG SUPPLY STERILE: Performed by: SPECIALIST

## 2024-05-21 PROCEDURE — 2500000003 HC RX 250 WO HCPCS: Performed by: NURSE ANESTHETIST, CERTIFIED REGISTERED

## 2024-05-21 RX ORDER — METOCLOPRAMIDE HYDROCHLORIDE 5 MG/ML
INJECTION INTRAMUSCULAR; INTRAVENOUS PRN
Status: DISCONTINUED | OUTPATIENT
Start: 2024-05-21 | End: 2024-05-21 | Stop reason: SDUPTHER

## 2024-05-21 RX ORDER — SODIUM CHLORIDE, SODIUM LACTATE, POTASSIUM CHLORIDE, CALCIUM CHLORIDE 600; 310; 30; 20 MG/100ML; MG/100ML; MG/100ML; MG/100ML
INJECTION, SOLUTION INTRAVENOUS CONTINUOUS
Status: DISCONTINUED | OUTPATIENT
Start: 2024-05-21 | End: 2024-05-21 | Stop reason: HOSPADM

## 2024-05-21 RX ORDER — DIPHENHYDRAMINE HYDROCHLORIDE 50 MG/ML
12.5 INJECTION INTRAMUSCULAR; INTRAVENOUS
Status: DISCONTINUED | OUTPATIENT
Start: 2024-05-21 | End: 2024-05-21 | Stop reason: HOSPADM

## 2024-05-21 RX ORDER — FENTANYL CITRATE 50 UG/ML
25 INJECTION, SOLUTION INTRAMUSCULAR; INTRAVENOUS EVERY 5 MIN PRN
Status: DISCONTINUED | OUTPATIENT
Start: 2024-05-21 | End: 2024-05-21 | Stop reason: HOSPADM

## 2024-05-21 RX ORDER — IPRATROPIUM BROMIDE AND ALBUTEROL SULFATE 2.5; .5 MG/3ML; MG/3ML
1 SOLUTION RESPIRATORY (INHALATION)
Status: DISCONTINUED | OUTPATIENT
Start: 2024-05-21 | End: 2024-05-21 | Stop reason: HOSPADM

## 2024-05-21 RX ORDER — ONDANSETRON 2 MG/ML
INJECTION INTRAMUSCULAR; INTRAVENOUS PRN
Status: DISCONTINUED | OUTPATIENT
Start: 2024-05-21 | End: 2024-05-21 | Stop reason: SDUPTHER

## 2024-05-21 RX ORDER — SODIUM CHLORIDE 0.9 % (FLUSH) 0.9 %
5-40 SYRINGE (ML) INJECTION EVERY 12 HOURS SCHEDULED
Status: DISCONTINUED | OUTPATIENT
Start: 2024-05-21 | End: 2024-05-21 | Stop reason: HOSPADM

## 2024-05-21 RX ORDER — SODIUM CHLORIDE 0.9 % (FLUSH) 0.9 %
5-40 SYRINGE (ML) INJECTION PRN
Status: DISCONTINUED | OUTPATIENT
Start: 2024-05-21 | End: 2024-05-21 | Stop reason: HOSPADM

## 2024-05-21 RX ORDER — NALOXONE HYDROCHLORIDE 0.4 MG/ML
INJECTION, SOLUTION INTRAMUSCULAR; INTRAVENOUS; SUBCUTANEOUS PRN
Status: DISCONTINUED | OUTPATIENT
Start: 2024-05-21 | End: 2024-05-21 | Stop reason: HOSPADM

## 2024-05-21 RX ORDER — OXYCODONE HYDROCHLORIDE 5 MG/1
5 TABLET ORAL
Status: DISCONTINUED | OUTPATIENT
Start: 2024-05-21 | End: 2024-05-21 | Stop reason: HOSPADM

## 2024-05-21 RX ORDER — OXYCODONE HYDROCHLORIDE AND ACETAMINOPHEN 5; 325 MG/1; MG/1
1 TABLET ORAL EVERY 6 HOURS PRN
Qty: 28 TABLET | Refills: 0 | Status: SHIPPED | OUTPATIENT
Start: 2024-05-21 | End: 2024-05-28

## 2024-05-21 RX ORDER — ONDANSETRON 2 MG/ML
4 INJECTION INTRAMUSCULAR; INTRAVENOUS
Status: DISCONTINUED | OUTPATIENT
Start: 2024-05-21 | End: 2024-05-21 | Stop reason: HOSPADM

## 2024-05-21 RX ORDER — KETOROLAC TROMETHAMINE 30 MG/ML
INJECTION, SOLUTION INTRAMUSCULAR; INTRAVENOUS PRN
Status: DISCONTINUED | OUTPATIENT
Start: 2024-05-21 | End: 2024-05-21 | Stop reason: SDUPTHER

## 2024-05-21 RX ORDER — PROPOFOL 10 MG/ML
INJECTION, EMULSION INTRAVENOUS PRN
Status: DISCONTINUED | OUTPATIENT
Start: 2024-05-21 | End: 2024-05-21 | Stop reason: SDUPTHER

## 2024-05-21 RX ORDER — HYDROMORPHONE HYDROCHLORIDE 1 MG/ML
0.5 INJECTION, SOLUTION INTRAMUSCULAR; INTRAVENOUS; SUBCUTANEOUS EVERY 5 MIN PRN
Status: DISCONTINUED | OUTPATIENT
Start: 2024-05-21 | End: 2024-05-21 | Stop reason: HOSPADM

## 2024-05-21 RX ORDER — PROCHLORPERAZINE EDISYLATE 5 MG/ML
5 INJECTION INTRAMUSCULAR; INTRAVENOUS
Status: DISCONTINUED | OUTPATIENT
Start: 2024-05-21 | End: 2024-05-21 | Stop reason: HOSPADM

## 2024-05-21 RX ORDER — ROCURONIUM BROMIDE 10 MG/ML
INJECTION, SOLUTION INTRAVENOUS PRN
Status: DISCONTINUED | OUTPATIENT
Start: 2024-05-21 | End: 2024-05-21 | Stop reason: SDUPTHER

## 2024-05-21 RX ORDER — CIPROFLOXACIN 2 MG/ML
400 INJECTION, SOLUTION INTRAVENOUS
Status: DISCONTINUED | OUTPATIENT
Start: 2024-05-21 | End: 2024-05-21 | Stop reason: HOSPADM

## 2024-05-21 RX ORDER — MIDAZOLAM HYDROCHLORIDE 1 MG/ML
INJECTION INTRAMUSCULAR; INTRAVENOUS PRN
Status: DISCONTINUED | OUTPATIENT
Start: 2024-05-21 | End: 2024-05-21 | Stop reason: SDUPTHER

## 2024-05-21 RX ORDER — LABETALOL HYDROCHLORIDE 5 MG/ML
10 INJECTION, SOLUTION INTRAVENOUS
Status: DISCONTINUED | OUTPATIENT
Start: 2024-05-21 | End: 2024-05-21 | Stop reason: HOSPADM

## 2024-05-21 RX ORDER — FENTANYL CITRATE 50 UG/ML
INJECTION, SOLUTION INTRAMUSCULAR; INTRAVENOUS PRN
Status: DISCONTINUED | OUTPATIENT
Start: 2024-05-21 | End: 2024-05-21 | Stop reason: SDUPTHER

## 2024-05-21 RX ORDER — CIPROFLOXACIN 2 MG/ML
INJECTION, SOLUTION INTRAVENOUS PRN
Status: DISCONTINUED | OUTPATIENT
Start: 2024-05-21 | End: 2024-05-21 | Stop reason: SDUPTHER

## 2024-05-21 RX ORDER — SODIUM CHLORIDE 9 MG/ML
INJECTION, SOLUTION INTRAVENOUS PRN
Status: DISCONTINUED | OUTPATIENT
Start: 2024-05-21 | End: 2024-05-21 | Stop reason: HOSPADM

## 2024-05-21 RX ORDER — LIDOCAINE HYDROCHLORIDE 20 MG/ML
INJECTION, SOLUTION INTRAVENOUS PRN
Status: DISCONTINUED | OUTPATIENT
Start: 2024-05-21 | End: 2024-05-21 | Stop reason: SDUPTHER

## 2024-05-21 RX ORDER — KETAMINE HCL IN NACL, ISO-OSM 100MG/10ML
SYRINGE (ML) INJECTION PRN
Status: DISCONTINUED | OUTPATIENT
Start: 2024-05-21 | End: 2024-05-21 | Stop reason: SDUPTHER

## 2024-05-21 RX ORDER — SCOLOPAMINE TRANSDERMAL SYSTEM 1 MG/1
1 PATCH, EXTENDED RELEASE TRANSDERMAL ONCE
Status: DISCONTINUED | OUTPATIENT
Start: 2024-05-21 | End: 2024-05-21 | Stop reason: HOSPADM

## 2024-05-21 RX ADMIN — CIPROFLOXACIN 400 MG: 2 INJECTION, SOLUTION INTRAVENOUS at 07:26

## 2024-05-21 RX ADMIN — FENTANYL CITRATE 100 MCG: 50 INJECTION, SOLUTION INTRAMUSCULAR; INTRAVENOUS at 07:33

## 2024-05-21 RX ADMIN — MIDAZOLAM 2 MG: 1 INJECTION INTRAMUSCULAR; INTRAVENOUS at 07:25

## 2024-05-21 RX ADMIN — METOCLOPRAMIDE 10 MG: 5 INJECTION, SOLUTION INTRAMUSCULAR; INTRAVENOUS at 08:01

## 2024-05-21 RX ADMIN — LIDOCAINE HYDROCHLORIDE 100 MG: 20 INJECTION, SOLUTION INTRAVENOUS at 07:37

## 2024-05-21 RX ADMIN — ONDANSETRON 4 MG: 2 INJECTION INTRAMUSCULAR; INTRAVENOUS at 07:42

## 2024-05-21 RX ADMIN — KETOROLAC TROMETHAMINE 15 MG: 30 INJECTION, SOLUTION INTRAMUSCULAR; INTRAVENOUS at 08:03

## 2024-05-21 RX ADMIN — SODIUM CHLORIDE, SODIUM LACTATE, POTASSIUM CHLORIDE, AND CALCIUM CHLORIDE: 600; 310; 30; 20 INJECTION, SOLUTION INTRAVENOUS at 06:25

## 2024-05-21 RX ADMIN — HYDROMORPHONE HYDROCHLORIDE 0.5 MG: 1 INJECTION, SOLUTION INTRAMUSCULAR; INTRAVENOUS; SUBCUTANEOUS at 08:10

## 2024-05-21 RX ADMIN — PROPOFOL 200 MG: 10 INJECTION, EMULSION INTRAVENOUS at 07:37

## 2024-05-21 RX ADMIN — SUGAMMADEX 300 MG: 100 INJECTION, SOLUTION INTRAVENOUS at 08:06

## 2024-05-21 RX ADMIN — ROCURONIUM BROMIDE 50 MG: 10 INJECTION INTRAVENOUS at 07:37

## 2024-05-21 RX ADMIN — SODIUM CHLORIDE, SODIUM LACTATE, POTASSIUM CHLORIDE, AND CALCIUM CHLORIDE: 600; 310; 30; 20 INJECTION, SOLUTION INTRAVENOUS at 08:02

## 2024-05-21 RX ADMIN — Medication 20 MG: at 07:52

## 2024-05-21 ASSESSMENT — PAIN SCALES - GENERAL
PAINLEVEL_OUTOF10: 0

## 2024-05-21 ASSESSMENT — PAIN - FUNCTIONAL ASSESSMENT
PAIN_FUNCTIONAL_ASSESSMENT: 0-10
PAIN_FUNCTIONAL_ASSESSMENT: ACTIVITIES ARE NOT PREVENTED

## 2024-05-21 NOTE — PERIOP NOTE
TRANSFER - IN REPORT:    Verbal report received from Shakeel Montes CRNA RN  on Aide Grace  being received from OR  for routine progression of patient care      Report consisted of patient's Situation, Background, Assessment and   Recommendations(SBAR).     Information from the following report(s) Adult Overview, Surgery Report, Intake/Output, and MAR was reviewed with the receiving nurse.    Opportunity for questions and clarification was provided.      Assessment completed upon patient's arrival to unit and care assumed.

## 2024-05-21 NOTE — H&P
Diagnostic Laparoscopy- History and Physical    Subjective:     The patient is a 48 y.o. obese female with a Body mass index is 29.24 kg/m².  She is 15 months post gastric bypass who was recently seen by our NP for C/O of acute onset of LUQ pain with tenderness that worsens with certain positions or movements.  Given her symptoms and the exam findings consistent with LUQ pain the decision was made to order a CT scan with the following findings; Possible ulcer or diverticulum in the gastric pouch with inflammatory changes in the adjacent left lobe of the liver No evidence of internal hernia.  Given all factors she understands the need to proceed with diagnostic laparoscopy with EGD.    Bariatric comorbidities continue to include:   Patient Active Problem List   Diagnosis    High cholesterol    Arthritis    Gallstones    GERD (gastroesophageal reflux disease)    Anxiety    Hypertension    Kidney stones    Obstructive sleep apnea syndrome    DJD (degenerative joint disease), cervical    Intestinal malabsorption    S/P gastric bypass      Patient Active Problem List    Diagnosis Date Noted    Intestinal malabsorption 02/22/2023    S/P gastric bypass 02/22/2023    High cholesterol 09/19/2022    Arthritis 09/19/2022    Gallstones 09/19/2022    GERD (gastroesophageal reflux disease) 09/19/2022    Anxiety 09/19/2022    Kidney stones 09/19/2022    DJD (degenerative joint disease), cervical 09/19/2022    Obstructive sleep apnea syndrome 01/01/2020    Hypertension 01/01/2017      Past Medical History:   Diagnosis Date    Anxiety and depression 2012    on meds    Arthritis 2021    OA-knees, back    DJD (degenerative joint disease), cervical 2021    no surgery or injections    Environmental and seasonal allergies 2014    on meds    Gall stones 2022    having surgery on 10/5/2023    GERD (gastroesophageal reflux disease) 2021    UGI with erosive gastritis, not on meds currently, was treated in the past    H/O gastric bypass

## 2024-05-21 NOTE — PERIOP NOTE
Reviewed PTA medication list with patient/caregiver and patient/caregiver denies any additional medications.     Patient admits to having a responsible adult care for them at home for at least 24 hours after surgery.    Patient encouraged to use gown warming system and informed that using said warming gown to regulate body temperature prior to a procedure has been shown to help reduce the risks of blood clots and infection.    Patient's pharmacy of choice verified and documented in PTA medication section.    Dual skin assessment & fall risk band verification completed with WALESKA Bello RN.

## 2024-05-21 NOTE — OP NOTE
[address]  [address]                            OPERATIVE REPORT      PATIENT NAME: MRAGA LONG              : 1975  MED REC NO: [id number]                       ROOM: OR  ACCOUNT NO: [id number]                       ADMIT DATE: 2024  PROVIDER: Ney Huizar MD    DATE OF SERVICE:  2024    PREOPERATIVE DIAGNOSES:  Abdominal pain, status post gastric bypass procedure.    POSTOPERATIVE DIAGNOSES:  Abdominal pain, status post gastric bypass procedure with:     1. Band adhesion causing partial obstruction of small bowel.     2. Marginal ulcer.    PROCEDURES PERFORMED:       1. Diagnostic laparoscopy with lysis of adhesions.     2. Intraoperative endoscopy with biopsy.    SURGEON:  Ney Huizar MD    ASSISTANT:  EFREN Norris.    EFREN Newman assisted with this procedure since no qualified surgeons, interns, or residents available.  He assisted with exposure of procedure, adhesiolysis, closure of skin and fascial incision.    ANESTHESIA:  General endotracheal.    ESTIMATED BLOOD LOSS:  None.    SPECIMENS REMOVED:  Endoscopy biopsy specimen.    INTRAOPERATIVE FINDINGS:   see dictation     COMPLICATIONS:  None.    IMPLANTS:  None.    INDICATIONS:  This is a patient of mine, who is 15 months out from the gastric bypass procedure performed laparoscopically.  She has had a great weight loss and had her gallbladder taken out last year due to biliary tract disease and symptoms related to such.  She has developed new-onset pain and had a CT scan recently, which revealed some thickening of her gastric pouch.  The pain has persisted.  She does have symptoms that did seem different than ulcer-related pain with a pulling in her back and these symptoms are concerning for internal hernia.  Despite a normal CT scan, from the standpoint, I have recommended diagnostic laparoscopy and an endoscopy.    DESCRIPTION OF

## 2024-05-21 NOTE — ANESTHESIA POSTPROCEDURE EVALUATION
Department of Anesthesiology  Postprocedure Note    Patient: Aide Grace  MRN: [id number]  YOB: 1975  Date of evaluation: 5/21/2024    Procedure Summary       Date: 05/21/24 Room / Location: [facility] 01 / [facility] MAIN OR    Anesthesia Start: 0726 Anesthesia Stop: 0820    Procedures:       DIAGNOSTIC LAPAROSCOPY (Abdomen)      WITH INTRAOPERATIVE ENDOSCOPY (Upper GI Region) Diagnosis:       Abdominal pain, left upper quadrant      Bariatric surgery status      (Abdominal pain, left upper quadrant [R10.12])      (Bariatric surgery status [Z98.84])    Surgeons: Ney Huizar MD Responsible Provider: Live Herrera DO    Anesthesia Type: General ASA Status: 2            Anesthesia Type: General    Carmela Phase I: Carmela Score: 8    Carmela Phase II: Carmela Score: 10    Anesthesia Post Evaluation    Patient location during evaluation: PACU  Patient participation: complete - patient participated  Level of consciousness: awake and alert  Pain score: 1  Airway patency: patent  Nausea & Vomiting: no nausea and no vomiting  Cardiovascular status: blood pressure returned to baseline  Respiratory status: acceptable  Hydration status: euvolemic  Multimodal analgesia pain management approach  Pain management: adequate    No notable events documented.

## 2024-05-21 NOTE — BRIEF OP NOTE
Brief Postoperative Note      Patient: Aide Grace  YOB: 1975  MRN: [id number]    Date of Procedure: 5/21/2024    Pre-Op Diagnosis Codes:     * Abdominal pain, left upper quadrant [R10.12]     * Bariatric surgery status [Z98.84]    Post-Op Diagnosis:  band adhesions X 2 causing partial small bowel obstruction                                    Marginal ulcer       Procedure(s):  DIAGNOSTIC LAPAROSCOPY  WITH INTRAOPERATIVE ENDOSCOPY    Surgeon(s):  Ney Huizar MD    Assistant:  Physician Assistant: Chilango Newman PA    Anesthesia: General    Estimated Blood Loss (mL): Minimal    Complications: None    Specimens:   * No specimens in log *    Implants:  * No implants in log *      Drains: * No LDAs found *    Findings:  Infection Present At Time Of Surgery (PATOS) (choose all levels that have infection present):  No infection present  Other Findings: see dictation     Electronically signed by EFREN Gardner on 5/21/2024 at 8:04 AM

## 2024-05-21 NOTE — PERIOP NOTE
TRANSFER - OUT REPORT:    Verbal report given to Danika LEIVA  on Aide Grace  being transferred to Phase II  for routine progression of patient care       Report consisted of patient's Situation, Background, Assessment and   Recommendations(SBAR).     Information from the following report(s) Surgery Report, Intake/Output, MAR, and Recent Results was reviewed with the receiving nurse.           Lines:   Peripheral IV 05/21/24 Left;Posterior Hand (Active)   Site Assessment Clean, dry & intact 05/21/24 0823   Line Status Infusing 05/21/24 0823   Line Care Connections checked and tightened 05/21/24 0823   Phlebitis Assessment No symptoms 05/21/24 0823   Infiltration Assessment 0 05/21/24 0823   Alcohol Cap Used No 05/21/24 0625   Dressing Status Clean, dry & intact 05/21/24 0823   Dressing Type Transparent 05/21/24 0823   Dressing Intervention New 05/21/24 0625        Opportunity for questions and clarification was provided.      Patient transported with:  Registered Nurse

## 2024-05-21 NOTE — ANESTHESIA PRE PROCEDURE
Department of Anesthesiology  Preprocedure Note       Name:  Aide Grace   Age:  48 y.o.  :  1975                                          MRN:  [id number]         Date:  2024      Surgeon: Surgeon(s):  Ney Huizar MD    Procedure: Procedure(s):  DIAGNOSTIC LAPAROSCOPY  WITH INTRAOPERATIVE ENDOSCOPY    Medications prior to admission:   Prior to Admission medications    Medication Sig Start Date End Date Taking? Authorizing Provider   pantoprazole (PROTONIX) 40 MG tablet Take 1 tablet by mouth 2 times daily (before meals) 5/15/24   Ara Calvo APRN - NP   sucralfate (CARAFATE) 1 GM/10ML suspension Take 10 mLs by mouth 4 times daily (before meals and nightly) 5/15/24   Ara Calvo APRN - NP   rosuvastatin (CRESTOR) 10 MG tablet Take 1 tablet by mouth at bedtime    Pepe Carter MD   venlafaxine 150 MG extended release tablet Take 1 tablet by mouth daily (with breakfast)    Pepe Carter MD   clonazePAM (KLONOPIN) 1 MG tablet Take 1 tablet by mouth 2 times daily as needed.    Pepe Carter MD   ARIPiprazole (ABILIFY) 5 MG tablet Take 1 tablet by mouth daily    Pepe Carter MD   Multiple Vitamins-Minerals (THERAPEUTIC MULTIVITAMIN-MINERALS) tablet Take 1 tablet by mouth daily    Pepe Carter MD   b complex vitamins capsule Take 1 capsule by mouth daily    Pepe Carter MD   calcium carbonate (OSCAL) 500 MG TABS tablet Take 1 tablet by mouth daily    Pepe Carter MD   Cholecalciferol (VITAMIN D) 50 MCG (2000 UT) CAPS capsule Take by mouth    Pepe Carter MD   Cobalamin Combinations (B-12) 1000-400 MCG SUBL Place under the tongue    Pepe Carter MD   fluticasone (FLONASE) 50 MCG/ACT nasal spray 1 spray by Each Nostril route daily    Pepe Carter MD   cetirizine (ZYRTEC) 5 MG tablet Take 1 tablet by mouth at bedtime Indications: allergies    Pepe Carter MD       Current medications:    Current

## 2024-05-21 NOTE — DISCHARGE INSTRUCTIONS
RESUME PRE HOSPITAL DIET  DRINK PLENTY OF FLUIDS  AMBULATE IN HOUSE  TAKE PRESCRIPTIONS AS DIRECTED  AVOID HEAVY LIFTING, PUSHING OR PULLING  AVOID STRAINING  AVOID CONSTIPATION  SHOWER TOMORROW  FOLLOW UP WITH DR. BIANCHI AS SCHEDULED     Laparoscopy: What to Expect at Home  Your Recovery  After laparoscopic surgery, you are likely to have pain for the next several days. You may have a low fever and feel tired and sick to your stomach. This is common. You should feel better after 1 to 2 weeks.  This care sheet gives you a general idea about how long it will take for you to recover. But each person recovers at a different pace. Follow the steps below to get better as quickly as possible.  How can you care for yourself at home?  Activity    Rest when you feel tired. Getting enough sleep will help you recover.     Try to walk each day. Start by walking a little more than you did the day before. Bit by bit, increase the amount you walk. Walking boosts blood flow and helps prevent pneumonia and constipation.     Avoid strenuous activities, such as bicycle riding, jogging, weight lifting, or aerobic exercise, until your doctor says it is okay.     Avoid lifting anything that would make you strain. This may include a child, heavy grocery bags and milk containers, a heavy briefcase or backpack, cat litter or dog food bags, or a vacuum .     You may also have some shoulder or back pain. This pain is caused by the gas your doctor used to inflate your belly to help see your organs better. The pain usually lasts about 1 or 2 days.     You may drive when you are no longer taking pain medicine and can quickly move your foot from the gas pedal to the brake. You must also be able to sit comfortably for a long period of time, even if you do not plan to go far. You might get caught in traffic.     You may need to take a few days to a few weeks off work. It depends on the type of work you do and how you feel.     You may

## 2024-05-21 NOTE — PERIOP NOTE
TRANSFER - IN REPORT:    Verbal report received from ERIKA Ogden RN on Aide Grace  being received from PACU for routine post-op      Report consisted of patient's Situation, Background, Assessment and   Recommendations(SBAR).     Information from the following report(s) Nurse Handoff Report, Adult Overview, Surgery Report, Intake/Output, and MAR was reviewed with the receiving nurse.    Opportunity for questions and clarification was provided.      Assessment completed upon patient's arrival to unit and care assumed.

## 2024-05-23 ENCOUNTER — TELEPHONE (OUTPATIENT)
Age: 49
End: 2024-05-23

## 2024-05-23 NOTE — TELEPHONE ENCOUNTER
This RN spoke with patient post-operatively.     Nausea and/or vomitting: None    Pain: Currently managed with Tylenol    Lap sites: No erythema, drainage, and/or swelling    BM: Daily since yesterday    Ambulation: Patient is walking throughout house every hour.    IS: Patient has one, but is not using it.  RN re-educated her on the importance of doing so throughout the day, and she verbalized understanding.    Temperature: RN educated patient on monitoring daily and to contact the office with a temperature of 100.5 degrees and/or above.  She verbalized understanding.    Medications: (confirmed currently taking)   *Bariatric regimen: yes    Questions: None    This RN reminded the patient to contact the office with any questions and/or concerns.  Patient verbalized understanding.  Patient's two week post-op visit is scheduled and was confirmed.

## 2024-06-06 ENCOUNTER — OFFICE VISIT (OUTPATIENT)
Age: 49
End: 2024-06-06

## 2024-06-06 VITALS
OXYGEN SATURATION: 100 % | HEART RATE: 88 BPM | DIASTOLIC BLOOD PRESSURE: 82 MMHG | WEIGHT: 163.5 LBS | TEMPERATURE: 97.3 F | BODY MASS INDEX: 28.97 KG/M2 | HEIGHT: 63 IN | SYSTOLIC BLOOD PRESSURE: 120 MMHG

## 2024-06-06 DIAGNOSIS — Z09 POSTOPERATIVE EXAMINATION: Primary | ICD-10-CM

## 2024-06-06 PROCEDURE — 99024 POSTOP FOLLOW-UP VISIT: CPT | Performed by: NURSE PRACTITIONER

## 2024-06-06 RX ORDER — PANTOPRAZOLE SODIUM 40 MG/1
40 TABLET, DELAYED RELEASE ORAL
Qty: 180 TABLET | Refills: 3 | Status: SHIPPED | OUTPATIENT
Start: 2024-06-06

## 2024-06-06 RX ORDER — SUCRALFATE ORAL 1 G/10ML
1 SUSPENSION ORAL
Qty: 414 ML | Refills: 5 | Status: SHIPPED | OUTPATIENT
Start: 2024-06-06

## 2024-06-06 NOTE — PATIENT INSTRUCTIONS
Continue focus on hydration.  May advance to soft diet. Please review material in your binder. Remember - your motto is \"soft foods with protein\".    Eat regularly. Continue to use protein shakes.  Remember to eat slowly & not to drink fluids with your meals.  Increase activity as able - cardio (walking) only.  Continue to take multi-vitamins.  Continue regular follow-up with your PCP.  Return to office as scheduled for your next appointment.  Call the office if you have any questions or concerns.

## 2024-08-21 ENCOUNTER — OFFICE VISIT (OUTPATIENT)
Age: 49
End: 2024-08-21
Payer: COMMERCIAL

## 2024-08-21 VITALS
HEIGHT: 63 IN | WEIGHT: 166.1 LBS | BODY MASS INDEX: 29.43 KG/M2 | OXYGEN SATURATION: 100 % | DIASTOLIC BLOOD PRESSURE: 72 MMHG | TEMPERATURE: 97.3 F | SYSTOLIC BLOOD PRESSURE: 122 MMHG | HEART RATE: 68 BPM

## 2024-08-21 DIAGNOSIS — K90.9 INTESTINAL MALABSORPTION, UNSPECIFIED TYPE: Primary | ICD-10-CM

## 2024-08-21 DIAGNOSIS — Z98.84 S/P GASTRIC BYPASS: ICD-10-CM

## 2024-08-21 DIAGNOSIS — K28.9 MARGINAL ULCER: ICD-10-CM

## 2024-08-21 PROCEDURE — 99214 OFFICE O/P EST MOD 30 MIN: CPT | Performed by: NURSE PRACTITIONER

## 2024-08-21 PROCEDURE — 3078F DIAST BP <80 MM HG: CPT | Performed by: NURSE PRACTITIONER

## 2024-08-21 PROCEDURE — 3074F SYST BP LT 130 MM HG: CPT | Performed by: NURSE PRACTITIONER

## 2024-08-21 NOTE — PROGRESS NOTES
Subjective:   Aide Grace  is a 49 y.o. female who presents for follow-up about 18 months following laparoscopic gastric bypass surgery.   Surgery related complication: NA, but diagnostic laparoscopy at 15 months postop for SHERRI and EGD showed marginal ulcer.    She has lost a total of 73 pounds since surgery.  Body mass index is 29.43 kg/m²..  Loss of EBW is 66%.      The patient presents today to assess their progress toward their weight loss goal & to address any issues that may be present:   She is tolerating solid foods without difficulty, reports no real issues  and denies vomiting, nausea, abdominal pain, difficulty swallowing, and reflux.  Fluid intake: good, 70 oz                              Protein intake:  2 shakes + food; cheese, nuts, chicken, cottage cheese  Eating regularly.   The patient is taking recommended vitamins.     The patient's exercise level: walking 2-3 days a week.      Changes in her medical history and medications have been reviewed.      Comorbidities:    Hypertension: resolved  Diabetes: N/A  Obstructive Sleep Apnea: resolved  Hyperlipidemia: improved, off statin  Stress Urinary Incontinence: N/A  Gastroesophageal Reflux:resolved  Weight related arthropathy:improved    Patient Active Problem List   Diagnosis    High cholesterol    Arthritis    Gallstones    GERD (gastroesophageal reflux disease)    Anxiety    Hypertension    Kidney stones    Obstructive sleep apnea syndrome    DJD (degenerative joint disease), cervical    Intestinal malabsorption    S/P gastric bypass    Abdominal adhesions    Partial small bowel obstruction (HCC)    Marginal ulcer     Past Medical History:   Diagnosis Date    Anxiety and depression 2012    on meds    Arthritis 2021    OA-knees, back    DJD (degenerative joint disease), cervical 2021    no surgery or injections    Environmental and seasonal allergies 2014    on meds    Gall stones 2022    having surgery on 10/5/2023    GERD (gastroesophageal

## 2024-08-21 NOTE — PATIENT INSTRUCTIONS
etc.)  - Steroid pills or injections  - Smoke (cigarettes or recreational drugs)  - Alcohol  Use of any of the above may cause ulcers in your stomach which may perforate causing a medical emergency and surgery. Speak to our medical staff if another medical provider requires you to take steroids or NSAIDs.          Supplement Resource Guide    Importance of Protein:   Maintains lean body mass, produces antibodies to fight off infections, heals wounds, minimizes hair loss, helps to give you energy, helps with satiety, and keeping you full between meals.    Importance of Calcium:  Needed for healthy bones and teeth, normal blood clotting, and nervous system functioning, higher risk of osteoporosis and bone disease with non-compliance.    Importance of Multivitamins:  Many functions.  Supply you with extra nutrients that you may be missing from food.  May lead to iron deficiency anemia, weakness, fatigue, and many other symptoms with non-compliance.    Importance of B Vitamins:  Important for red blood cell formation, metabolism, energy, and helps to maintain a healthy nervous system.    Protein Supplement  Find one you like now. Use immediately after surgery.   Look for:  35-50g protein each day from your protein supplement once you reach the progression diet.      0-3 g fat per serving  0-3 g sugar per serving    Protein drinks should be split in separate dosages.    Recommend: Lifelong  1 year + Calcium Supplement:     Start taking within a month after surgery.   Look for: Calcium Citrate Plus D (1500 mg per day)  Recommend: Citracal     .            Avoid chocolate chewable calcium. Can use chewable bariatric or [employer] brand or similar chewable.    The body cannot absorb more than 500-600 mg of calcium at a time.      Take for Life Multi-vitamin Supplement:      Start immediately after surgery: any complete chewable, such as: [place]’s Complete chewables.    Avoid [place] sours or gummies.  They lack iron and

## 2024-12-23 RX ORDER — PANTOPRAZOLE SODIUM 40 MG/1
40 TABLET, DELAYED RELEASE ORAL
Qty: 180 TABLET | Refills: 3 | Status: SHIPPED | OUTPATIENT
Start: 2024-12-23

## 2025-02-24 ENCOUNTER — TELEMEDICINE (OUTPATIENT)
Age: 50
End: 2025-02-24
Payer: COMMERCIAL

## 2025-02-24 VITALS — WEIGHT: 167 LBS | BODY MASS INDEX: 29.59 KG/M2 | HEIGHT: 63 IN

## 2025-02-24 DIAGNOSIS — Z98.84 S/P GASTRIC BYPASS: ICD-10-CM

## 2025-02-24 DIAGNOSIS — K90.9 INTESTINAL MALABSORPTION, UNSPECIFIED TYPE: Primary | ICD-10-CM

## 2025-02-24 DIAGNOSIS — K21.9 GASTROESOPHAGEAL REFLUX DISEASE WITHOUT ESOPHAGITIS: ICD-10-CM

## 2025-02-24 PROCEDURE — 99214 OFFICE O/P EST MOD 30 MIN: CPT | Performed by: NURSE PRACTITIONER

## 2025-02-24 RX ORDER — FAMOTIDINE 20 MG/1
20 TABLET, FILM COATED ORAL 2 TIMES DAILY
Qty: 60 TABLET | Refills: 11 | Status: SHIPPED | OUTPATIENT
Start: 2025-02-24

## 2025-02-24 NOTE — PROGRESS NOTES
Subjective:   Aide Grace  is a 49 y.o. female who presents for follow-up about 2 years following laparoscopic gastric bypass surgery.   Surgery related complication: NA, but diagnostic laparoscopy at 15 months postop for SHERRI and EGD showed marginal ulcer.  .    She has lost a total of 72 pounds since surgery.  Body mass index is 29.59 kg/m²..  Loss of EBW is 65%.      The patient presents today to assess their progress toward their weight loss goal & to address any issues that may be present:   She is tolerating solid foods without difficulty, reports reflux at night and denies vomiting, nausea, abdominal pain, and difficulty swallowing.  Fluid intake: good                              Protein intake:  1 shake + food; tolerating all proteins  Eating regularly.   The patient is taking recommended vitamins.     The patient's exercise level: decreased exercise.      Changes in her medical history and medications have been reviewed.      Comorbidities:    Hypertension: resolved  Diabetes: N/A  Obstructive Sleep Apnea: resolved  Hyperlipidemia: improved, off statin  Stress Urinary Incontinence: N/A  Gastroesophageal Reflux:resolved  Weight related arthropathy:improved    Patient Active Problem List   Diagnosis    High cholesterol    Arthritis    Gallstones    GERD (gastroesophageal reflux disease)    Anxiety    Hypertension    Kidney stones    Obstructive sleep apnea syndrome    DJD (degenerative joint disease), cervical    Intestinal malabsorption    S/P gastric bypass    Abdominal adhesions    Partial small bowel obstruction (HCC)    Marginal ulcer     Past Medical History:   Diagnosis Date    Anxiety and depression 2012    on meds    Arthritis 2021    OA-knees, back    DJD (degenerative joint disease), cervical 2021    no surgery or injections    Environmental and seasonal allergies 2014    on meds    Gall stones 2022    having surgery on 10/5/2023    GERD (gastroesophageal reflux disease) 2021    UGI with

## 2025-03-25 RX ORDER — PANTOPRAZOLE SODIUM 40 MG/1
40 TABLET, DELAYED RELEASE ORAL
Qty: 180 TABLET | Refills: 3 | Status: SHIPPED | OUTPATIENT
Start: 2025-03-25

## 2025-03-25 RX ORDER — FAMOTIDINE 20 MG/1
20 TABLET, FILM COATED ORAL 2 TIMES DAILY
Qty: 60 TABLET | Refills: 11 | Status: SHIPPED | OUTPATIENT
Start: 2025-03-25

## 2025-04-28 ENCOUNTER — TELEPHONE (OUTPATIENT)
Age: 50
End: 2025-04-28

## 2025-04-28 RX ORDER — PANTOPRAZOLE SODIUM 40 MG/1
40 TABLET, DELAYED RELEASE ORAL
Qty: 180 TABLET | Refills: 3 | OUTPATIENT
Start: 2025-04-28

## 2025-04-28 RX ORDER — FAMOTIDINE 20 MG/1
20 TABLET, FILM COATED ORAL 2 TIMES DAILY
Qty: 60 TABLET | Refills: 11 | OUTPATIENT
Start: 2025-04-28

## 2025-04-28 NOTE — TELEPHONE ENCOUNTER
LMTCB re my chart refill medication, the two medications that have been requested have been prescribed for a year. Pt needs to call pharmacy to refill.

## 2025-05-12 ENCOUNTER — OFFICE VISIT (OUTPATIENT)
Age: 50
End: 2025-05-12
Payer: COMMERCIAL

## 2025-05-12 ENCOUNTER — PREP FOR PROCEDURE (OUTPATIENT)
Age: 50
End: 2025-05-12

## 2025-05-12 VITALS
BODY MASS INDEX: 30.56 KG/M2 | WEIGHT: 172.5 LBS | HEART RATE: 95 BPM | DIASTOLIC BLOOD PRESSURE: 91 MMHG | OXYGEN SATURATION: 99 % | TEMPERATURE: 97.4 F | SYSTOLIC BLOOD PRESSURE: 138 MMHG | HEIGHT: 63 IN

## 2025-05-12 DIAGNOSIS — Z98.84 S/P GASTRIC BYPASS: ICD-10-CM

## 2025-05-12 DIAGNOSIS — Z98.84 BARIATRIC SURGERY STATUS: ICD-10-CM

## 2025-05-12 DIAGNOSIS — K25.9 GASTRIC ULCER, UNSPECIFIED CHRONICITY, UNSPECIFIED WHETHER GASTRIC ULCER HEMORRHAGE OR PERFORATION PRESENT: ICD-10-CM

## 2025-05-12 DIAGNOSIS — R10.13 EPIGASTRIC PAIN: Primary | ICD-10-CM

## 2025-05-12 DIAGNOSIS — K28.9 ANASTOMOTIC ULCER: ICD-10-CM

## 2025-05-12 DIAGNOSIS — R10.13 ABDOMINAL PAIN, EPIGASTRIC: ICD-10-CM

## 2025-05-12 DIAGNOSIS — K90.9 INTESTINAL MALABSORPTION, UNSPECIFIED TYPE: ICD-10-CM

## 2025-05-12 PROBLEM — K56.600 PARTIAL SMALL BOWEL OBSTRUCTION (HCC): Status: RESOLVED | Noted: 2024-05-21 | Resolved: 2025-05-12

## 2025-05-12 PROBLEM — K66.0 ABDOMINAL ADHESIONS: Status: RESOLVED | Noted: 2024-05-21 | Resolved: 2025-05-12

## 2025-05-12 PROBLEM — K80.20 GALLSTONES: Status: RESOLVED | Noted: 2022-09-19 | Resolved: 2025-05-12

## 2025-05-12 PROCEDURE — 99214 OFFICE O/P EST MOD 30 MIN: CPT | Performed by: SPECIALIST

## 2025-05-12 PROCEDURE — 3074F SYST BP LT 130 MM HG: CPT | Performed by: SPECIALIST

## 2025-05-12 PROCEDURE — 3080F DIAST BP >= 90 MM HG: CPT | Performed by: SPECIALIST

## 2025-05-12 RX ORDER — OMEPRAZOLE 20 MG/1
20 CAPSULE, DELAYED RELEASE ORAL 2 TIMES DAILY
Qty: 60 CAPSULE | Refills: 3 | Status: SHIPPED | OUTPATIENT
Start: 2025-05-12

## 2025-05-12 RX ORDER — SUCRALFATE 1 G/1
1 TABLET ORAL 3 TIMES DAILY
Qty: 90 TABLET | Refills: 3 | Status: SHIPPED | OUTPATIENT
Start: 2025-05-12

## 2025-05-12 RX ORDER — BUSPIRONE HYDROCHLORIDE 10 MG/1
10 TABLET ORAL 2 TIMES DAILY
COMMUNITY
Start: 2025-03-27

## 2025-05-12 NOTE — H&P (VIEW-ONLY)
Feb 2023 - gastric bypass (239 / 111)    Oct 2023 - lap ryann    May 2024 - Dx lap'scope for band adhesions X 2 causing SBO (pt also found to have ulcer on EGD)    Subjective:     Aide Grace  is a 49 y.o. female who presents for follow-up about 2.25 years following gastric bypass. She has lost a total of 67 pounds since surgery.  Body mass index is 30.56 kg/m².. EBWL is (60%). The patient presents today to assess their progress toward their goal of weight loss and to address any issues that may be present.  Today the patient and I have reviewed their diet and how appropriate their food choices are.  The following issues have been identified - here with cramping / gnawing epigastric pain that started 96 hours ago that she describes as \"just like the pain I had with my ulcer last year\".  She reports immediate epigastric pain with most all PO intake.  She has no risks factors of ulcer disease (she had no risk factors for ulcer disease last May when one was noted on EGD).          5/12/2025     2:00 PM 5/12/2025     1:56 PM 2/24/2025     8:15 AM 8/21/2024     2:50 PM 6/6/2024    10:53 AM 5/21/2024     9:27 AM 5/21/2024     9:26 AM   Ambulatory Bariatric Summary   Systolic 138 128  122 120     Diastolic 91 97  72 82     Pulse  95  68 88 83 78   Temp  97.4 °F (36.3 °C)  97.3 °F (36.3 °C) 97.3 °F (36.3 °C)     Weight - Scale  172.5 167 166.1 163.5     Height  1.6 m (5' 3\") 1.6 m (5' 2.99\") 1.6 m (5' 2.99\") 1.6 m (5' 2.99\")     BMI  30.6 kg/m2 29.7 kg/m2 29.5 kg/m2 29 kg/m2     Weight - Scale  78.2 kg (172 lb 8 oz) 75.8 kg (167 lb) 75.3 kg (166 lb 1.6 oz) 74.2 kg (163 lb 8 oz)     BMI (Calculated)  30.6 29.7 29.5 29       Surgery related complication: NA     She reports epigastric pain that she reports are similar to her ulcer pains last year and denies vomiting.    Patients pain score: 0/10        5/12/2025     2:00 PM 5/12/2025     1:56 PM 2/24/2025     8:15 AM 8/21/2024     2:50 PM 6/6/2024    10:53 AM 5/21/2024  9.4 05/15/2024 02:47 PM    HGB 12.7 05/15/2024 02:47 PM    HCT 38.3 05/15/2024 02:47 PM     05/15/2024 02:47 PM    MCV 94.3 05/15/2024 02:47 PM     Lab Results   Component Value Date/Time     05/15/2024 02:47 PM    K 4.3 05/15/2024 02:47 PM     05/15/2024 02:47 PM    CO2 26 05/15/2024 02:47 PM    BUN 24 05/15/2024 02:47 PM    GLOB 3.2 05/15/2024 02:47 PM    ALT 56 05/15/2024 02:47 PM     No results found for: \"IRON\", \"TIBC\"  No results found for: \"RBCF\"  No results found for: \"VITD3\"      @[employer]@    Assessment:     1. History of Morbid obesity, status post  gastric bypass with known history of gastric ulcer disease has new onset epigastric pains that she states are exactly the same as her ulcer pain from last year.  The patient claims no ulcerogenic risk factors other than stress.      She has a benign abdominal exam    I will send carafate and PPI to her pharmacy and plan for an EGD    Labs and urine to be drawn / collected today in prep for EGD and to further evaluate for ulcerogenic agents     Plan:     Remember to measure portions, continue low carbohydrate diet  Continue to concentrate on protein intake meeting daily requirements  Remember vitamin supplements. The importance of such was discussed regarding the malabsorptive issues that the surgery creates.  Exercise regimen appears to be: adequate  Try and attend support group if feasible.  Follow-up PRN for future EGD.  Lab reviewed and appropriate changes made.  Total time spent with the patient 30 minutes.

## 2025-05-12 NOTE — PROGRESS NOTES
Feb 2023 - gastric bypass (239 / 111)    Oct 2023 - lap ryann    May 2024 - Dx lap'scope for band adhesions X 2 causing SBO (pt also found to have ulcer on EGD)    Subjective:     Aide Grace  is a 49 y.o. female who presents for follow-up about 2.25 years following gastric bypass. She has lost a total of 67 pounds since surgery.  Body mass index is 30.56 kg/m².. EBWL is (60%). The patient presents today to assess their progress toward their goal of weight loss and to address any issues that may be present.  Today the patient and I have reviewed their diet and how appropriate their food choices are.  The following issues have been identified - here with cramping / gnawing epigastric pain that started 96 hours ago that she describes as \"just like the pain I had with my ulcer last year\".  She reports immediate epigastric pain with most all PO intake.  She has no risks factors of ulcer disease (she had no risk factors for ulcer disease last May when one was noted on EGD).          5/12/2025     2:00 PM 5/12/2025     1:56 PM 2/24/2025     8:15 AM 8/21/2024     2:50 PM 6/6/2024    10:53 AM 5/21/2024     9:27 AM 5/21/2024     9:26 AM   Ambulatory Bariatric Summary   Systolic 138 128  122 120     Diastolic 91 97  72 82     Pulse  95  68 88 83 78   Temp  97.4 °F (36.3 °C)  97.3 °F (36.3 °C) 97.3 °F (36.3 °C)     Weight - Scale  172.5 167 166.1 163.5     Height  1.6 m (5' 3\") 1.6 m (5' 2.99\") 1.6 m (5' 2.99\") 1.6 m (5' 2.99\")     BMI  30.6 kg/m2 29.7 kg/m2 29.5 kg/m2 29 kg/m2     Weight - Scale  78.2 kg (172 lb 8 oz) 75.8 kg (167 lb) 75.3 kg (166 lb 1.6 oz) 74.2 kg (163 lb 8 oz)     BMI (Calculated)  30.6 29.7 29.5 29       Surgery related complication: NA     She reports epigastric pain that she reports are similar to her ulcer pains last year and denies vomiting.    Patients pain score: 0/10        5/12/2025     2:00 PM 5/12/2025     1:56 PM 2/24/2025     8:15 AM 8/21/2024     2:50 PM 6/6/2024    10:53 AM 5/21/2024

## 2025-05-13 ENCOUNTER — HOSPITAL ENCOUNTER (OUTPATIENT)
Facility: HOSPITAL | Age: 50
Setting detail: SPECIMEN
Discharge: HOME | End: 2025-05-16

## 2025-05-13 LAB — LABCORP SPECIMEN COLLECTION: NORMAL

## 2025-05-13 PROCEDURE — 99001 SPECIMEN HANDLING PT-LAB: CPT

## 2025-05-14 PROBLEM — K21.9 GASTROESOPHAGEAL REFLUX DISEASE: Status: ACTIVE | Noted: 2025-05-12

## 2025-05-14 LAB — SPECIMEN STATUS REPORT: NORMAL

## 2025-05-15 ENCOUNTER — RESULTS FOLLOW-UP (OUTPATIENT)
Age: 50
End: 2025-05-15

## 2025-05-15 LAB
25(OH)D3+25(OH)D2 SERPL-MCNC: 47.6 NG/ML (ref 30–100)
ALBUMIN SERPL-MCNC: 4.7 G/DL (ref 3.9–4.9)
ALP SERPL-CCNC: 81 IU/L (ref 44–121)
ALT SERPL-CCNC: 32 IU/L (ref 0–32)
AMPHETAMINES UR QL SCN: NEGATIVE NG/ML
AST SERPL-CCNC: 29 IU/L (ref 0–40)
BARBITURATES UR QL SCN: NEGATIVE NG/ML
BASOPHILS # BLD AUTO: 0 X10E3/UL (ref 0–0.2)
BASOPHILS NFR BLD AUTO: 1 %
BENZODIAZ UR QL SCN: NEGATIVE NG/ML
BILIRUB SERPL-MCNC: 0.4 MG/DL (ref 0–1.2)
BUN SERPL-MCNC: 9 MG/DL (ref 6–24)
BUN/CREAT SERPL: 16 (ref 9–23)
BUPRENORPHINE UR QL: NEGATIVE NG/ML
BZE UR QL SCN: NEGATIVE NG/ML
CALCIUM SERPL-MCNC: 9.4 MG/DL (ref 8.7–10.2)
CANNABINOIDS UR QL SCN: NEGATIVE NG/ML
CHLORIDE SERPL-SCNC: 98 MMOL/L (ref 96–106)
CO2 SERPL-SCNC: 24 MMOL/L (ref 20–29)
COTININE UR QL SCN: NEGATIVE NG/ML
CREAT SERPL-MCNC: 0.58 MG/DL (ref 0.57–1)
CREAT UR-MCNC: 102.4 MG/DL (ref 20–300)
EGFRCR SERPLBLD CKD-EPI 2021: 112 ML/MIN/1.73
EOSINOPHIL # BLD AUTO: 0.1 X10E3/UL (ref 0–0.4)
EOSINOPHIL NFR BLD AUTO: 2 %
ERYTHROCYTE [DISTWIDTH] IN BLOOD BY AUTOMATED COUNT: 12.7 % (ref 11.7–15.4)
FERRITIN SERPL-MCNC: 45 NG/ML (ref 15–150)
FOLATE SERPL-MCNC: 16.3 NG/ML
GLOBULIN SER CALC-MCNC: 2.3 G/DL (ref 1.5–4.5)
GLUCOSE SERPL-MCNC: 75 MG/DL (ref 70–99)
HCT VFR BLD AUTO: 40.6 % (ref 34–46.6)
HGB BLD-MCNC: 13.5 G/DL (ref 11.1–15.9)
IMM GRANULOCYTES # BLD AUTO: 0 X10E3/UL (ref 0–0.1)
IMM GRANULOCYTES NFR BLD AUTO: 0 %
IRON SERPL-MCNC: 84 UG/DL (ref 27–159)
LABORATORY COMMENT REPORT: NORMAL
LYMPHOCYTES # BLD AUTO: 2.1 X10E3/UL (ref 0.7–3.1)
LYMPHOCYTES NFR BLD AUTO: 41 %
MCH RBC QN AUTO: 32.5 PG (ref 26.6–33)
MCHC RBC AUTO-ENTMCNC: 33.3 G/DL (ref 31.5–35.7)
MCV RBC AUTO: 98 FL (ref 79–97)
METHADONE UR QL SCN: NEGATIVE NG/ML
MONOCYTES # BLD AUTO: 0.4 X10E3/UL (ref 0.1–0.9)
MONOCYTES NFR BLD AUTO: 8 %
NEUTROPHILS # BLD AUTO: 2.5 X10E3/UL (ref 1.4–7)
NEUTROPHILS NFR BLD AUTO: 48 %
OPIATES UR QL SCN: NEGATIVE NG/ML
OXYCODONE+OXYMORPHONE UR QL SCN: NEGATIVE NG/ML
PCP UR QL: NEGATIVE NG/ML
PH UR: 5.9 [PH] (ref 4.5–8.9)
PLATELET # BLD AUTO: 229 X10E3/UL (ref 150–450)
POTASSIUM SERPL-SCNC: 3.9 MMOL/L (ref 3.5–5.2)
PROPOXYPH UR QL SCN: NEGATIVE NG/ML
PROT SERPL-MCNC: 7 G/DL (ref 6–8.5)
RBC # BLD AUTO: 4.15 X10E6/UL (ref 3.77–5.28)
SERVICE CMNT-IMP: NORMAL
SODIUM SERPL-SCNC: 137 MMOL/L (ref 134–144)
SPECIMEN STATUS REPORT: NORMAL
SPECIMEN STATUS REPORT: NORMAL
VIT B12 SERPL-MCNC: >2000 PG/ML (ref 232–1245)
WBC # BLD AUTO: 5.1 X10E3/UL (ref 3.4–10.8)

## 2025-05-16 LAB — VIT B1 BLD-SCNC: 164.6 NMOL/L (ref 66.5–200)

## 2025-05-20 ENCOUNTER — HOSPITAL ENCOUNTER (OUTPATIENT)
Facility: HOSPITAL | Age: 50
Setting detail: OUTPATIENT SURGERY
Discharge: HOME | End: 2025-05-20
Attending: SPECIALIST | Admitting: SPECIALIST
Payer: COMMERCIAL

## 2025-05-20 ENCOUNTER — ANESTHESIA EVENT (OUTPATIENT)
Facility: HOSPITAL | Age: 50
End: 2025-05-20
Payer: COMMERCIAL

## 2025-05-20 ENCOUNTER — ANESTHESIA (OUTPATIENT)
Facility: HOSPITAL | Age: 50
End: 2025-05-20
Payer: COMMERCIAL

## 2025-05-20 VITALS
OXYGEN SATURATION: 100 % | TEMPERATURE: 98.1 F | HEART RATE: 73 BPM | DIASTOLIC BLOOD PRESSURE: 80 MMHG | RESPIRATION RATE: 18 BRPM | SYSTOLIC BLOOD PRESSURE: 129 MMHG

## 2025-05-20 PROCEDURE — 2709999900 HC NON-CHARGEABLE SUPPLY: Performed by: SPECIALIST

## 2025-05-20 PROCEDURE — 2580000003 HC RX 258: Performed by: SPECIALIST

## 2025-05-20 PROCEDURE — 3600000002 HC SURGERY LEVEL 2 BASE: Performed by: SPECIALIST

## 2025-05-20 PROCEDURE — 6360000002 HC RX W HCPCS: Performed by: NURSE ANESTHETIST, CERTIFIED REGISTERED

## 2025-05-20 PROCEDURE — 7100000010 HC PHASE II RECOVERY - FIRST 15 MIN: Performed by: SPECIALIST

## 2025-05-20 PROCEDURE — 7100000011 HC PHASE II RECOVERY - ADDTL 15 MIN: Performed by: SPECIALIST

## 2025-05-20 PROCEDURE — 3700000000 HC ANESTHESIA ATTENDED CARE: Performed by: SPECIALIST

## 2025-05-20 PROCEDURE — 88305 TISSUE EXAM BY PATHOLOGIST: CPT

## 2025-05-20 RX ORDER — OXYCODONE HYDROCHLORIDE 5 MG/1
5 TABLET ORAL
Refills: 0 | Status: CANCELLED | OUTPATIENT
Start: 2025-05-20

## 2025-05-20 RX ORDER — PROPOFOL 10 MG/ML
INJECTION, EMULSION INTRAVENOUS
Status: DISCONTINUED | OUTPATIENT
Start: 2025-05-20 | End: 2025-05-20 | Stop reason: SDUPTHER

## 2025-05-20 RX ORDER — ONDANSETRON 2 MG/ML
4 INJECTION INTRAMUSCULAR; INTRAVENOUS
Status: CANCELLED | OUTPATIENT
Start: 2025-05-20

## 2025-05-20 RX ORDER — IPRATROPIUM BROMIDE AND ALBUTEROL SULFATE 2.5; .5 MG/3ML; MG/3ML
1 SOLUTION RESPIRATORY (INHALATION)
Status: CANCELLED | OUTPATIENT
Start: 2025-05-20

## 2025-05-20 RX ORDER — SODIUM CHLORIDE 9 MG/ML
INJECTION, SOLUTION INTRAVENOUS PRN
Status: CANCELLED | OUTPATIENT
Start: 2025-05-20

## 2025-05-20 RX ORDER — LABETALOL HYDROCHLORIDE 5 MG/ML
10 INJECTION, SOLUTION INTRAVENOUS
Status: CANCELLED | OUTPATIENT
Start: 2025-05-20

## 2025-05-20 RX ORDER — MEPERIDINE HYDROCHLORIDE 50 MG/ML
12.5 INJECTION INTRAMUSCULAR; INTRAVENOUS; SUBCUTANEOUS AS NEEDED
Refills: 0 | Status: CANCELLED | OUTPATIENT
Start: 2025-05-20

## 2025-05-20 RX ORDER — SODIUM CHLORIDE 0.9 % (FLUSH) 0.9 %
5-40 SYRINGE (ML) INJECTION EVERY 12 HOURS SCHEDULED
Status: CANCELLED | OUTPATIENT
Start: 2025-05-20

## 2025-05-20 RX ORDER — LIDOCAINE HYDROCHLORIDE 20 MG/ML
INJECTION, SOLUTION INTRAVENOUS
Status: DISCONTINUED | OUTPATIENT
Start: 2025-05-20 | End: 2025-05-20 | Stop reason: SDUPTHER

## 2025-05-20 RX ORDER — HYDROMORPHONE HYDROCHLORIDE 1 MG/ML
0.5 INJECTION, SOLUTION INTRAMUSCULAR; INTRAVENOUS; SUBCUTANEOUS EVERY 5 MIN PRN
Refills: 0 | Status: CANCELLED | OUTPATIENT
Start: 2025-05-20

## 2025-05-20 RX ORDER — NALOXONE HYDROCHLORIDE 0.4 MG/ML
INJECTION, SOLUTION INTRAMUSCULAR; INTRAVENOUS; SUBCUTANEOUS PRN
Status: CANCELLED | OUTPATIENT
Start: 2025-05-20

## 2025-05-20 RX ORDER — SODIUM CHLORIDE 9 MG/ML
INJECTION, SOLUTION INTRAVENOUS CONTINUOUS
Status: DISCONTINUED | OUTPATIENT
Start: 2025-05-20 | End: 2025-05-20 | Stop reason: HOSPADM

## 2025-05-20 RX ORDER — SODIUM CHLORIDE, SODIUM LACTATE, POTASSIUM CHLORIDE, CALCIUM CHLORIDE 600; 310; 30; 20 MG/100ML; MG/100ML; MG/100ML; MG/100ML
INJECTION, SOLUTION INTRAVENOUS CONTINUOUS
Status: DISCONTINUED | OUTPATIENT
Start: 2025-05-20 | End: 2025-05-20 | Stop reason: HOSPADM

## 2025-05-20 RX ORDER — DROPERIDOL 2.5 MG/ML
0.62 INJECTION, SOLUTION INTRAMUSCULAR; INTRAVENOUS
Status: CANCELLED | OUTPATIENT
Start: 2025-05-20

## 2025-05-20 RX ORDER — SODIUM CHLORIDE, SODIUM LACTATE, POTASSIUM CHLORIDE, CALCIUM CHLORIDE 600; 310; 30; 20 MG/100ML; MG/100ML; MG/100ML; MG/100ML
INJECTION, SOLUTION INTRAVENOUS CONTINUOUS
Status: CANCELLED | OUTPATIENT
Start: 2025-05-20

## 2025-05-20 RX ORDER — DIPHENHYDRAMINE HYDROCHLORIDE 50 MG/ML
12.5 INJECTION, SOLUTION INTRAMUSCULAR; INTRAVENOUS
Status: CANCELLED | OUTPATIENT
Start: 2025-05-20

## 2025-05-20 RX ORDER — FENTANYL CITRATE 50 UG/ML
25 INJECTION, SOLUTION INTRAMUSCULAR; INTRAVENOUS EVERY 5 MIN PRN
Refills: 0 | Status: CANCELLED | OUTPATIENT
Start: 2025-05-20

## 2025-05-20 RX ORDER — SODIUM CHLORIDE 0.9 % (FLUSH) 0.9 %
5-40 SYRINGE (ML) INJECTION PRN
Status: CANCELLED | OUTPATIENT
Start: 2025-05-20

## 2025-05-20 RX ADMIN — PROPOFOL 50 MG: 10 INJECTION, EMULSION INTRAVENOUS at 14:32

## 2025-05-20 RX ADMIN — LIDOCAINE HYDROCHLORIDE 60 MG: 20 INJECTION, SOLUTION INTRAVENOUS at 14:32

## 2025-05-20 RX ADMIN — SODIUM CHLORIDE: 0.9 INJECTION, SOLUTION INTRAVENOUS at 13:54

## 2025-05-20 ASSESSMENT — PAIN - FUNCTIONAL ASSESSMENT: PAIN_FUNCTIONAL_ASSESSMENT: NONE - DENIES PAIN

## 2025-05-20 NOTE — DISCHARGE INSTRUCTIONS
Aide Grace  [id number]  1975    EGD DISCHARGE INSTRUCTIONS    Discomfort:  Sore throat- throat lozenges or warm salt water gargle  redness at IV site- apply warm compress to area; if redness or soreness persist- contact your physician  Gaseous discomfort- walking, belching will help relieve any discomfort  You should not operate a vehicle for 12 hours  You should note engage in an occupation involving machinery or appliances for rest of today  You may note drink alcoholic beverages for at least 12 hours  Avoid making any critical decisions for at least 24 hour  DIET  You may resume your regular diet - however -  remember your colon is empty and a heavy meal will produce gas.   Avoid these foods:  vegetables, fried / greasy foods, carbonated drinks    ACTIVITY  You may resume your normal daily activities   Spend the remainder of the day resting -  avoid any strenuous activity.    CALL M.D.  ANY SIGN OF   Increasing pain, nausea, vomiting  Abdominal distension (swelling)  New increased bleeding (oral or rectal)  Fever (chills)  Pain in chest area  Bloody discharge from nose or mouth  Shortness of breath       Follow-up Instructions:   Dr Huizar will call you in one to two weeks. If you do not hear from him, please call his office [phone].                  Aide Grace  [id number]  1975        DISCHARGE SUMMARY from Nurse    The following personal items collected during your admission are returned to you:   Dental Appliance:    Vision:    Hearing Aid:    Jewelry:    Clothing:    Other Valuables:    Valuables sent to safe: Dose (mL/hr) Propofol : *50 mg    PATIENT INSTRUCTIONS:    Take Home Medications:        DISCHARGE SUMMARY from Nurse    PATIENT INSTRUCTIONS:    After general anesthesia or intravenous sedation, for 24 hours or while taking prescription Narcotics:  Limit your activities  Do not drive and operate hazardous machinery  Do not make important personal or business decisions  Do  not  caregiver verbalized understanding.  Discharge medications reviewed with the patient and caregiver and appropriate educational materials and side effects teaching were provided.           Nausea and Vomiting After Surgery: Care Instructions  Your Care Instructions     After you've had surgery, you may feel sick to your stomach (nauseated) or you may vomit. Sometimes anesthesia can make you feel sick. It's a common side effect and often doesn't last long. Pain also can make you feel sick or vomit. After the anesthesia wears off, you may feel pain from the incision (cut). That pain could then upset your stomach. Taking pain medicine can also make you feel sick to your stomach.  Whatever the cause, you may get medicine that can help. There are also some things you can do at home to prevent nausea and feel better.  The doctor has checked you carefully, but problems can develop later. If you notice any problems or new symptoms, get medical treatment right away.  Follow-up care is a key part of your treatment and safety. Be sure to make and go to all appointments, and call your doctor if you are having problems. It's also a good idea to know your test results and keep a list of the medicines you take.  How can you care for yourself at home?  Take your pain medicine as soon as you have pain. It works better if you take it before the pain gets bad.  Call your doctor if you have any problems with your medicine.  To prevent dehydration, drink plenty of fluids. Choose water and other clear liquids until you feel better. If you have kidney, heart, or liver disease and have to limit fluids, talk with your doctor before you increase the amount of fluids you drink.  When you are able to eat, try clear soups, mild foods, and liquids until all symptoms are gone for 12 to 48 hours. Other good choices include dry toast, crackers, cooked cereal, and gelatin dessert, such as Jell-O.  Do not smoke. Smoking and being around smoke can make

## 2025-05-20 NOTE — PERIOP NOTE
Reviewed PTA medication list with patient/caregiver and patient/caregiver denies any additional medications.     Patient admits to having a responsible adult care for them at home for at least 24 hours after surgery.    Patient encouraged to use gown warming system and informed that using said warming gown to regulate body temperature prior to a procedure has been shown to help reduce the risks of blood clots and infection.    Patient's pharmacy of choice verified and documented in PTA medication section.    Dual skin assessment & fall risk band verification completed with DAT HAYDEN RN.

## 2025-05-20 NOTE — PERIOP NOTE
Discharge instructions reviewed with patient and family.  Opportunity for questions and answers given.  No further questions at this time.   No intake/output data recorded.  I/O this shift:  In: 1440 [P.O.:240; I.V.:1200]  Out: 0

## 2025-05-20 NOTE — ANESTHESIA POSTPROCEDURE EVALUATION
Department of Anesthesiology  Postprocedure Note    Patient: Aide Grace  MRN: [id number]  YOB: 1975  Date of evaluation: 5/20/2025    Procedure Summary       Date: 05/20/25 Room / Location: [facility] 01 / [facility] MAIN OR    Anesthesia Start: 1428 Anesthesia Stop: 1441    Procedure: ESOPHAGOGASTRODUODENOSCOPY(EGD) WITH BIOPSY (Upper GI Region) Diagnosis:       Abdominal pain, epigastric      Anastomotic ulcer      Gastroesophageal reflux disease      Bariatric surgery status      (Abdominal pain, epigastric [R10.13])      (Anastomotic ulcer [K28.9])      (Gastroesophageal reflux disease [K21.9])      (Bariatric surgery status [Z98.84])    Surgeons: Ney Huizar MD Responsible Provider: Kenan Douglas MD    Anesthesia Type: MAC ASA Status: 3            Anesthesia Type: MAC    Carmela Phase I:      Carmela Phase II:      Anesthesia Post Evaluation    Patient location during evaluation: bedside  Patient participation: complete - patient participated  Level of consciousness: awake  Airway patency: patent  Nausea & Vomiting: no nausea and no vomiting  Cardiovascular status: hemodynamically stable  Respiratory status: acceptable  Hydration status: stable  Pain management: satisfactory to patient    No notable events documented.

## 2025-05-20 NOTE — PERIOP NOTE
TRANSFER - IN REPORT:    Verbal report received from Sterling LEIVA on Aide Grace  being received from OR for routine post-op      Report consisted of patient's Situation, Background, Assessment and   Recommendations(SBAR).     Information from the following report(s) Nurse Handoff Report, Adult Overview, Surgery Report, Intake/Output, and MAR was reviewed with the receiving nurse.    Opportunity for questions and clarification was provided.      Assessment completed upon patient's arrival to unit and care assumed.

## 2025-05-20 NOTE — PROCEDURES
Esophagogastroduodenoscopy Procedure Note    Indications: History of Morbid obesity, status post  gastric bypass 2.25 years ago with known history of gastric ulcer disease has new onset epigastric pains that she states are exactly the same as her ulcer pain from last year.  The patient claims no ulcerogenic risk factors other than stress.     Anesthesia/Sedation: MAC anesthesia    Pre-Procedure Exam:  Airway: clear   Heart: normal S1and S2    Lungs: clear bilateral  Abdomen: soft, nontender, bowel sounds present and normal in all quadrants   Mental Status: awake, alert, and oriented to person, place, and time      Procedure in Detail:  Informed consent was obtained for the procedure, including conscious sedation. Risks of pancreatitis, infection, perforation, hemorrhage, adverse drug reaction, and aspiration were discussed. The patient was placed in the left lateral decubitus position.  Based on the pre-procedure assessment, including review of the patient's medical history, medications, allergies, and review of systems, he had been deemed to be an appropriate candidate for moderate sedation; he was therefore sedated with the medications listed above.  He was monitored continuously with electrocardiogram tracing, pulse oximetry, blood pressure monitoring, and direct observation.      The [id number] gastroscope was inserted into the mouth and advanced under direct vision to the proximal sarwat limb.  A careful inspection was made as the gastroscope was withdrawn, including a retroflexed view of the proximal stomach; findings and interventions are described below. Appropriate photodocumentation was obtained.    Findings:   Oropharynx - normal mucosa without ulcer or obstruction of deformity   Esophagus - normal mucosa without rings, webs or other obstructions or abnormalities  Pouch - normal mucosa and adequate size without ulcer, erythema or other abnormalities  Anastomosis - shallow 8mm ulcer at the 12 o'clock

## 2025-05-20 NOTE — INTERVAL H&P NOTE
Update History & Physical    The patient's History and Physical of May 12, 2025 was reviewed with the patient and I examined the patient. There was no change. The surgical site was confirmed by the patient and me.     Plan: The risks, benefits, expected outcome, and alternative to the recommended procedure have been discussed with the patient. Patient understands and wants to proceed with the procedure.     Electronically signed by Ney Huizar MD on 5/20/2025 at 1:45 PM

## 2025-05-20 NOTE — ANESTHESIA PRE PROCEDURE
summary reviewed and Nursing notes reviewed   history of anesthetic complications: PONV.  Airway: Mallampati: II  TM distance: >3 FB   Neck ROM: full  Mouth opening: > = 3 FB   Dental: normal exam         Pulmonary:normal exam    (+)     sleep apnea:                                  Cardiovascular:    (+) hypertension:                  Neuro/Psych:   (+) psychiatric history:            GI/Hepatic/Renal:   (+) GERD:          Endo/Other: Negative Endo/Other ROS                    Abdominal: normal exam            Vascular:          Other Findings:       Anesthesia Plan      MAC     ASA 3       Induction: intravenous.      Anesthetic plan and risks discussed with patient.      Plan discussed with CRNA.                Kenan Douglas MD   5/20/2025

## 2025-06-02 RX ORDER — OMEPRAZOLE 20 MG/1
20 CAPSULE, DELAYED RELEASE ORAL 2 TIMES DAILY
Qty: 60 CAPSULE | Refills: 3 | Status: SHIPPED | OUTPATIENT
Start: 2025-06-02

## 2025-07-07 RX ORDER — OMEPRAZOLE 20 MG/1
20 CAPSULE, DELAYED RELEASE ORAL 2 TIMES DAILY
Qty: 60 CAPSULE | Refills: 3 | Status: SHIPPED | OUTPATIENT
Start: 2025-07-07

## 2025-07-07 RX ORDER — SUCRALFATE 1 G/1
1 TABLET ORAL 3 TIMES DAILY
Qty: 90 TABLET | Refills: 3 | Status: SHIPPED | OUTPATIENT
Start: 2025-07-07

## 2025-07-18 PROBLEM — K25.9 GASTRIC ULCER, UNSP AS ACUTE OR CHRONIC, W/O HEMOR OR PERF: Status: ACTIVE | Noted: 2025-07-18

## 2025-07-21 RX ORDER — SUCRALFATE 1 G/1
1 TABLET ORAL 3 TIMES DAILY
Qty: 90 TABLET | Refills: 3 | OUTPATIENT
Start: 2025-07-21

## 2025-07-21 RX ORDER — OMEPRAZOLE 20 MG/1
20 CAPSULE, DELAYED RELEASE ORAL 2 TIMES DAILY
Qty: 60 CAPSULE | Refills: 3 | OUTPATIENT
Start: 2025-07-21

## 2025-07-28 ENCOUNTER — OFFICE VISIT (OUTPATIENT)
Age: 50
End: 2025-07-28
Payer: COMMERCIAL

## 2025-07-28 VITALS
DIASTOLIC BLOOD PRESSURE: 79 MMHG | WEIGHT: 176.5 LBS | TEMPERATURE: 97.7 F | HEART RATE: 68 BPM | OXYGEN SATURATION: 100 % | BODY MASS INDEX: 31.27 KG/M2 | HEIGHT: 63 IN | SYSTOLIC BLOOD PRESSURE: 120 MMHG

## 2025-07-28 DIAGNOSIS — R10.13 ABDOMINAL PAIN, EPIGASTRIC: ICD-10-CM

## 2025-07-28 DIAGNOSIS — K28.9 ANASTOMOTIC ULCER: Primary | ICD-10-CM

## 2025-07-28 DIAGNOSIS — Z98.84 S/P GASTRIC BYPASS: ICD-10-CM

## 2025-07-28 PROCEDURE — 99214 OFFICE O/P EST MOD 30 MIN: CPT | Performed by: SPECIALIST

## 2025-07-28 PROCEDURE — 3074F SYST BP LT 130 MM HG: CPT | Performed by: SPECIALIST

## 2025-07-28 PROCEDURE — 3078F DIAST BP <80 MM HG: CPT | Performed by: SPECIALIST

## 2025-07-28 NOTE — PROGRESS NOTES
EGD - History and Physical  02/09/23: LGBP. Pre-op wt: 239 lbs. EBW: 111 lbs.     Subjective:     The patient is a 50 y.o. obese female who is 2.42 years post gastric bypass with known history of ulcer disease dating back to last year when an EGD was performed in conjunction with a diagnostic laparoscopy for small bowel obstruction.  She had a repeat EGD in late May that showed a shallow 8mm ulcer at the 12 o'clock position.  She has been taking her carafate and PPI as directed with improvement in her symptoms.  She understands with findings of an ulcer on 2 consecutive EGDs she needs a follow-up EGD to document healing.    Patient Active Problem List    Diagnosis Date Noted    Intestinal malabsorption 02/22/2023    S/P gastric bypass 02/22/2023    Gastric ulcer, unsp as acute or chronic, w/o hemor or perf 07/18/2025    Abdominal pain, epigastric 05/12/2025    Anastomotic ulcer 05/12/2025    Bariatric surgery status 05/12/2025    Gastroesophageal reflux disease 05/12/2025    Marginal ulcer 05/21/2024    High cholesterol 09/19/2022    Arthritis 09/19/2022    GERD (gastroesophageal reflux disease) 09/19/2022    Anxiety 09/19/2022    Kidney stones 09/19/2022    DJD (degenerative joint disease), cervical 09/19/2022    Obstructive sleep apnea syndrome 01/01/2020    Hypertension 01/01/2017      Past Surgical History:   Procedure Laterality Date    ANTERIOR CRUCIATE LIGAMENT REPAIR Left 02/2017    CHOLECYSTECTOMY, LAPAROSCOPIC N/A 10/5/2023    1. LAPAROSCOPIC  CHOLECYSTECTOMY performed by Ney Huizar MD at [facility] MAIN OR    COLONOSCOPY  2021    ESOPHAGOGASTRODUODENOSCOPY  03/23/2021    with BX    GASTRIC BYPASS SURGERY  02/09/2023    Dr Huizar    HYSTERECTOMY (CERVIX STATUS UNKNOWN)  02/04/2019    vaginal (partial) with A&P Repair    LAPAROSCOPY N/A 5/21/2024    DIAGNOSTIC LAPAROSCOPY performed by Ney Huizar MD at [facility] MAIN OR    UPPER GASTROINTESTINAL ENDOSCOPY N/A 5/21/2024    WITH INTRAOPERATIVE

## 2025-08-05 ENCOUNTER — ANESTHESIA EVENT (OUTPATIENT)
Facility: HOSPITAL | Age: 50
End: 2025-08-05
Payer: COMMERCIAL

## 2025-08-05 ENCOUNTER — HOSPITAL ENCOUNTER (OUTPATIENT)
Facility: HOSPITAL | Age: 50
Setting detail: OUTPATIENT SURGERY
Discharge: HOME | End: 2025-08-05
Attending: SPECIALIST | Admitting: SPECIALIST
Payer: COMMERCIAL

## 2025-08-05 ENCOUNTER — ANESTHESIA (OUTPATIENT)
Facility: HOSPITAL | Age: 50
End: 2025-08-05
Payer: COMMERCIAL

## 2025-08-05 VITALS
HEART RATE: 79 BPM | DIASTOLIC BLOOD PRESSURE: 85 MMHG | BODY MASS INDEX: 31.25 KG/M2 | SYSTOLIC BLOOD PRESSURE: 129 MMHG | OXYGEN SATURATION: 100 % | TEMPERATURE: 98 F | HEIGHT: 63 IN | WEIGHT: 176.4 LBS | RESPIRATION RATE: 18 BRPM

## 2025-08-05 PROBLEM — Z87.11 HISTORY OF GASTRIC ULCER: Status: ACTIVE | Noted: 2025-08-05

## 2025-08-05 PROCEDURE — 2580000003 HC RX 258: Performed by: SPECIALIST

## 2025-08-05 PROCEDURE — 7100000011 HC PHASE II RECOVERY - ADDTL 15 MIN: Performed by: SPECIALIST

## 2025-08-05 PROCEDURE — 3700000001 HC ADD 15 MINUTES (ANESTHESIA): Performed by: SPECIALIST

## 2025-08-05 PROCEDURE — 3600000012 HC SURGERY LEVEL 2 ADDTL 15MIN: Performed by: SPECIALIST

## 2025-08-05 PROCEDURE — 3600000002 HC SURGERY LEVEL 2 BASE: Performed by: SPECIALIST

## 2025-08-05 PROCEDURE — 6360000002 HC RX W HCPCS: Performed by: NURSE ANESTHETIST, CERTIFIED REGISTERED

## 2025-08-05 PROCEDURE — 3700000000 HC ANESTHESIA ATTENDED CARE: Performed by: SPECIALIST

## 2025-08-05 PROCEDURE — 7100000010 HC PHASE II RECOVERY - FIRST 15 MIN: Performed by: SPECIALIST

## 2025-08-05 PROCEDURE — 2709999900 HC NON-CHARGEABLE SUPPLY: Performed by: SPECIALIST

## 2025-08-05 RX ORDER — IPRATROPIUM BROMIDE AND ALBUTEROL SULFATE 2.5; .5 MG/3ML; MG/3ML
1 SOLUTION RESPIRATORY (INHALATION)
Status: CANCELLED | OUTPATIENT
Start: 2025-08-05

## 2025-08-05 RX ORDER — LIDOCAINE HYDROCHLORIDE 20 MG/ML
INJECTION, SOLUTION INTRAVENOUS
Status: DISCONTINUED | OUTPATIENT
Start: 2025-08-05 | End: 2025-08-05 | Stop reason: SDUPTHER

## 2025-08-05 RX ORDER — SODIUM CHLORIDE 0.9 % (FLUSH) 0.9 %
5-40 SYRINGE (ML) INJECTION PRN
Status: CANCELLED | OUTPATIENT
Start: 2025-08-05

## 2025-08-05 RX ORDER — SODIUM CHLORIDE 0.9 % (FLUSH) 0.9 %
5-40 SYRINGE (ML) INJECTION EVERY 12 HOURS SCHEDULED
Status: CANCELLED | OUTPATIENT
Start: 2025-08-05

## 2025-08-05 RX ORDER — SODIUM CHLORIDE 9 MG/ML
INJECTION, SOLUTION INTRAVENOUS PRN
Status: CANCELLED | OUTPATIENT
Start: 2025-08-05

## 2025-08-05 RX ORDER — ONDANSETRON 2 MG/ML
4 INJECTION INTRAMUSCULAR; INTRAVENOUS
Status: CANCELLED | OUTPATIENT
Start: 2025-08-05

## 2025-08-05 RX ORDER — DIPHENHYDRAMINE HYDROCHLORIDE 50 MG/ML
12.5 INJECTION, SOLUTION INTRAMUSCULAR; INTRAVENOUS
Status: CANCELLED | OUTPATIENT
Start: 2025-08-05

## 2025-08-05 RX ORDER — SODIUM CHLORIDE, SODIUM LACTATE, POTASSIUM CHLORIDE, CALCIUM CHLORIDE 600; 310; 30; 20 MG/100ML; MG/100ML; MG/100ML; MG/100ML
INJECTION, SOLUTION INTRAVENOUS CONTINUOUS
Status: DISCONTINUED | OUTPATIENT
Start: 2025-08-05 | End: 2025-08-05 | Stop reason: HOSPADM

## 2025-08-05 RX ORDER — LABETALOL HYDROCHLORIDE 5 MG/ML
10 INJECTION, SOLUTION INTRAVENOUS
Status: CANCELLED | OUTPATIENT
Start: 2025-08-05

## 2025-08-05 RX ORDER — SODIUM CHLORIDE, SODIUM LACTATE, POTASSIUM CHLORIDE, CALCIUM CHLORIDE 600; 310; 30; 20 MG/100ML; MG/100ML; MG/100ML; MG/100ML
INJECTION, SOLUTION INTRAVENOUS CONTINUOUS
Status: CANCELLED | OUTPATIENT
Start: 2025-08-05

## 2025-08-05 RX ORDER — DROPERIDOL 2.5 MG/ML
0.62 INJECTION, SOLUTION INTRAMUSCULAR; INTRAVENOUS
Status: CANCELLED | OUTPATIENT
Start: 2025-08-05

## 2025-08-05 RX ADMIN — SODIUM CHLORIDE, POTASSIUM CHLORIDE, SODIUM LACTATE AND CALCIUM CHLORIDE: 600; 310; 30; 20 INJECTION, SOLUTION INTRAVENOUS at 11:49

## 2025-08-05 RX ADMIN — LIDOCAINE HYDROCHLORIDE 50 MG: 20 INJECTION, SOLUTION INTRAVENOUS at 13:32

## 2025-08-05 RX ADMIN — LIDOCAINE HYDROCHLORIDE 50 MG: 20 INJECTION, SOLUTION INTRAVENOUS at 13:29

## 2025-08-05 ASSESSMENT — PAIN SCALES - GENERAL
PAINLEVEL_OUTOF10: 0

## 2025-08-05 ASSESSMENT — LIFESTYLE VARIABLES: SMOKING_STATUS: 0

## 2025-08-06 PROBLEM — K80.20 GALLSTONES: Status: ACTIVE | Noted: 2025-08-06

## (undated) DEVICE — SUTURE PDS II SZ 0 L27IN ABSRB VLT L26MM CT-2 1/2 CIR Z334H

## (undated) DEVICE — SOLUTION ANTIFOG VIS SYS CLEARIFY LAPSCP

## (undated) DEVICE — TROCAR LAP L100MM DIA5MM BLDELSS W/ STBL SL ENDOPATH XCEL

## (undated) DEVICE — SUTURE ETHIBOND EXCL BR GRN TAPR PT 2-0 30 X563H X563H

## (undated) DEVICE — ENDOCUT SCISSOR TIP, DISPOSABLE: Brand: RENEW

## (undated) DEVICE — SYRINGE MED 30ML STD CLR PLAS LUERLOCK TIP N CTRL DISP

## (undated) DEVICE — TUBING, SUCTION, 1/4" X 12', STRAIGHT: Brand: MEDLINE

## (undated) DEVICE — Device

## (undated) DEVICE — KIT SUTURING DEVICE M-CLOSE

## (undated) DEVICE — TISSUE RETRIEVAL SYSTEM: Brand: INZII RETRIEVAL SYSTEM

## (undated) DEVICE — GARMENT COMPR M FOR 12-18IN CALF INTMIT SGL BLDR HEMO-FORCE

## (undated) DEVICE — CATHETER CHOLGM 4.5FR L18IN W/ MTL SUPP TB

## (undated) DEVICE — RELOAD STPL L60MM H1.5-3.6MM REG TISS BLU GRIPPING SURF B

## (undated) DEVICE — Z DISCONTINUED USE 2635508 SOL IRRIGATION INJ NACL 0.9% 500ML BTL

## (undated) DEVICE — SLEEVE TRCR L100MM DIA5MM UNIV STBL FOR BLDELSS DIL TIP

## (undated) DEVICE — DISPOSABLE SUCTION/IRRIGATOR TUBE SET WITH TIP: Brand: AHTO

## (undated) DEVICE — SOLUTION IRRIG 500ML 0.9% SOD CHLO USP POUR PLAS BTL

## (undated) DEVICE — SUT MONOCRYL PLUS UD 4-0 --

## (undated) DEVICE — TROCAR ENDOSCP L100MM DIA12MM STBL SL BLDELSS ENDOPATH XCEL

## (undated) DEVICE — DRAPE C ARM UNIV W41XL74IN CLR PLAS XR VELC CLSR POLY STRP

## (undated) DEVICE — TROCAR ENDOSCP BLDELSS 12X100 MM W/ HNDL STBL SL OPT TIP

## (undated) DEVICE — SUTURE MONOCRYL + SZ 4-0 L27IN ABSRB UD L19MM PS-2 3/8 CIR MCP426H

## (undated) DEVICE — GARMENT,MEDLINE,DVT,INT,CALF,MED, GEN2: Brand: MEDLINE

## (undated) DEVICE — RESERVOIR,SUCTION,100CC,SILICONE: Brand: MEDLINE

## (undated) DEVICE — SYRINGE MED 10ML LUERLOCK TIP W/O SFTY DISP

## (undated) DEVICE — STRIP SKIN CLSR W1XL5IN NYLON REINF CURAD STERIL

## (undated) DEVICE — ENDOSCOPY PUMP TUBING/ CAP SET: Brand: ERBE

## (undated) DEVICE — BARIATRIC: Brand: MEDLINE INDUSTRIES, INC.

## (undated) DEVICE — RELOAD STPL H1-2.5X45MM VASC THN TISS WHT 6 ROW B FRM SGL

## (undated) DEVICE — FORCEPS BX OVL CUP SERR DISP CAP L 240CM RAD JAW 4

## (undated) DEVICE — CUTTER ENDOSCP L340MM LIN ARTC SGL STROKE FIRING ENDOPATH

## (undated) DEVICE — GLOVE ORANGE PI 8   MSG9080

## (undated) DEVICE — CATHETER IV 14GA L1.75IN OD2.146MM ID1.740MM ORNG VIALON

## (undated) DEVICE — SUTURE ETHLN SZ 3-0 L30IN NONABSORBABLE BLK FSL L30MM 3/8 1671H

## (undated) DEVICE — APPLICATOR LAP 35 CM 2 RIGID VISTASEAL

## (undated) DEVICE — TROCAR ENDOSCP L100MM DIA15MM BLDELSS STBL SL ENDOPATH XCEL

## (undated) DEVICE — ELECTRODE PT RET AD L9FT HI MOIST COND ADH HYDRGEL CORDED

## (undated) DEVICE — MASK O2 O2 LN L7FT CO2 LN L10FT FEM BG 750ML M CONC ADPT

## (undated) DEVICE — SOLUTION SURG PREP 26 CC PURPREP

## (undated) DEVICE — MOUTHPIECE ENDOSCP L CTRL OPN AND SIDE PORTS DISP

## (undated) DEVICE — RELOAD STPL L60MM H1-2.6MM MESENTERY THN TISS WHT 6 ROW

## (undated) DEVICE — AGENT HEMSTAT W6XL9IN OXIDIZED REGENERATED CELOS ABSRB FOR

## (undated) DEVICE — STAPLER SKIN L440MM 32MM LNG 12 FIRING B FRM PWR + GRIPPING

## (undated) DEVICE — CLIP SUT ENDOSCP F/2-0/3-0/4-0 -- LAPRA-TY

## (undated) DEVICE — APPLIER CLP L SHFT DIA12MM 20 ROT MULT LIGACLP

## (undated) DEVICE — [HIGH FLOW INSUFFLATOR,  DO NOT USE IF PACKAGE IS DAMAGED,  KEEP DRY,  KEEP AWAY FROM SUNLIGHT,  PROTECT FROM HEAT AND RADIOACTIVE SOURCES.]: Brand: PNEUMOSURE

## (undated) DEVICE — SUTURE VCRL + SZ 0 L27IN ABSRB VLT L26MM UR-6 5/8 CIR VCP603H

## (undated) DEVICE — NEEDLE INSUF L150MM DIA2MM DISP FOR PNEUMOPERI ENDOPATH

## (undated) DEVICE — APPLIER CLP M/L SHFT DIA5MM 15 LIG LIGAMAX 5

## (undated) DEVICE — SUTURE ETHIB EXCL BR GRN TAPR PT 2-0 30 X563H X563H

## (undated) DEVICE — STAPLER INT L37CM STPL 21MM CIR ENDOSCP CRV INTLUMN B FRM

## (undated) DEVICE — VISUALIZATION SYSTEM: Brand: CLEARIFY

## (undated) DEVICE — GLOVE ORANGE PI 7 1/2   MSG9075

## (undated) DEVICE — SEALANT TISS 10 CC FOR HUM FIBRIN VISTASEAL

## (undated) DEVICE — SOL INJ LR 1000ML --

## (undated) DEVICE — SUT ETHLN 3-0 18IN PS1 BLK --

## (undated) DEVICE — SYRINGE 20ML LL S/C 50

## (undated) DEVICE — RELOAD STPL H1.8-3.8MM REG THCK TISS G 6 ROW GRIPPING SURF

## (undated) DEVICE — E-Z CLEAN, PTFE COATED, ELECTROSURGICAL LAPAROSCOPIC ELECTRODE, L-HOOK, 33 CM., SINGLE-USE, FOR USE WITH HAND CONTROL PENCIL: Brand: MEGADYNE

## (undated) DEVICE — SHEARS LAP L45CM DIA5MM ULTRASONIC CRV TIP ADV HEMSTAS HARM

## (undated) DEVICE — TOTAL TRAY, 16FR 10ML SIL FOLEY, URN: Brand: MEDLINE

## (undated) DEVICE — SUT PROL 2-0 30IN CT2 BLU --

## (undated) DEVICE — TUBING SCTION CONN 1/4X12 RIB